# Patient Record
Sex: FEMALE | Race: WHITE | NOT HISPANIC OR LATINO | Employment: FULL TIME | URBAN - METROPOLITAN AREA
[De-identification: names, ages, dates, MRNs, and addresses within clinical notes are randomized per-mention and may not be internally consistent; named-entity substitution may affect disease eponyms.]

---

## 2017-03-20 ENCOUNTER — APPOINTMENT (EMERGENCY)
Dept: CT IMAGING | Facility: HOSPITAL | Age: 52
End: 2017-03-20
Payer: COMMERCIAL

## 2017-03-20 ENCOUNTER — HOSPITAL ENCOUNTER (EMERGENCY)
Facility: HOSPITAL | Age: 52
Discharge: HOME | End: 2017-03-20
Attending: EMERGENCY MEDICINE
Payer: COMMERCIAL

## 2017-03-20 VITALS
DIASTOLIC BLOOD PRESSURE: 67 MMHG | SYSTOLIC BLOOD PRESSURE: 130 MMHG | HEART RATE: 74 BPM | WEIGHT: 156.53 LBS | RESPIRATION RATE: 16 BRPM | OXYGEN SATURATION: 96 % | TEMPERATURE: 98 F

## 2017-03-20 DIAGNOSIS — R10.9 ABDOMINAL PAIN: Primary | ICD-10-CM

## 2017-03-20 DIAGNOSIS — K31.84 GASTROPARESIS: ICD-10-CM

## 2017-03-20 LAB
ALBUMIN SERPL BCP-MCNC: 3.9 G/DL (ref 3.5–5)
ALP SERPL-CCNC: 130 U/L (ref 46–116)
ALT SERPL W P-5'-P-CCNC: 53 U/L (ref 12–78)
ANION GAP SERPL CALCULATED.3IONS-SCNC: 10 MMOL/L (ref 4–13)
AST SERPL W P-5'-P-CCNC: 40 U/L (ref 5–45)
BASOPHILS # BLD AUTO: 0.01 THOUSANDS/ΜL (ref 0–0.1)
BASOPHILS NFR BLD AUTO: 0 % (ref 0–1)
BILIRUB SERPL-MCNC: 1 MG/DL (ref 0.2–1)
BUN SERPL-MCNC: 9 MG/DL (ref 5–25)
CALCIUM SERPL-MCNC: 9.3 MG/DL (ref 8.3–10.1)
CHLORIDE SERPL-SCNC: 101 MMOL/L (ref 100–108)
CLARITY, POC: CLEAR
CO2 SERPL-SCNC: 28 MMOL/L (ref 21–32)
COLOR, POC: YELLOW
CREAT SERPL-MCNC: 0.71 MG/DL (ref 0.6–1.3)
EOSINOPHIL # BLD AUTO: 0.04 THOUSAND/ΜL (ref 0–0.61)
EOSINOPHIL NFR BLD AUTO: 1 % (ref 0–6)
ERYTHROCYTE [DISTWIDTH] IN BLOOD BY AUTOMATED COUNT: 12.5 % (ref 11.6–15.1)
EXT BILIRUBIN, UA: ABNORMAL
EXT BLOOD URINE: ABNORMAL
EXT GLUCOSE, UA: ABNORMAL
EXT KETONES: ABNORMAL
EXT NITRITE, UA: ABNORMAL
EXT PH, UA: 6
EXT PROTEIN, UA: ABNORMAL
EXT SPECIFIC GRAVITY, UA: 1.01
EXT UROBILINOGEN: 0.2
GFR SERPL CREATININE-BSD FRML MDRD: >60 ML/MIN/1.73SQ M
GLUCOSE SERPL-MCNC: 227 MG/DL (ref 65–140)
HCT VFR BLD AUTO: 44.1 % (ref 34.8–46.1)
HGB BLD-MCNC: 14.8 G/DL (ref 11.5–15.4)
LIPASE SERPL-CCNC: 64 U/L (ref 73–393)
LYMPHOCYTES # BLD AUTO: 1.97 THOUSANDS/ΜL (ref 0.6–4.47)
LYMPHOCYTES NFR BLD AUTO: 28 % (ref 14–44)
MCH RBC QN AUTO: 28.8 PG (ref 26.8–34.3)
MCHC RBC AUTO-ENTMCNC: 33.6 G/DL (ref 31.4–37.4)
MCV RBC AUTO: 86 FL (ref 82–98)
MONOCYTES # BLD AUTO: 0.26 THOUSAND/ΜL (ref 0.17–1.22)
MONOCYTES NFR BLD AUTO: 4 % (ref 4–12)
NEUTROPHILS # BLD AUTO: 4.68 THOUSANDS/ΜL (ref 1.85–7.62)
NEUTS SEG NFR BLD AUTO: 67 % (ref 43–75)
PLATELET # BLD AUTO: 284 THOUSANDS/UL (ref 149–390)
PMV BLD AUTO: 9.8 FL (ref 8.9–12.7)
POTASSIUM SERPL-SCNC: 4 MMOL/L (ref 3.5–5.3)
PROT SERPL-MCNC: 7.3 G/DL (ref 6.4–8.2)
RBC # BLD AUTO: 5.13 MILLION/UL (ref 3.81–5.12)
SODIUM SERPL-SCNC: 139 MMOL/L (ref 136–145)
WBC # BLD AUTO: 6.96 THOUSAND/UL (ref 4.31–10.16)
WBC # BLD EST: ABNORMAL 10*3/UL

## 2017-03-20 PROCEDURE — 36415 COLL VENOUS BLD VENIPUNCTURE: CPT | Performed by: EMERGENCY MEDICINE

## 2017-03-20 PROCEDURE — 96374 THER/PROPH/DIAG INJ IV PUSH: CPT

## 2017-03-20 PROCEDURE — 83690 ASSAY OF LIPASE: CPT | Performed by: EMERGENCY MEDICINE

## 2017-03-20 PROCEDURE — 85025 COMPLETE CBC W/AUTO DIFF WBC: CPT | Performed by: EMERGENCY MEDICINE

## 2017-03-20 PROCEDURE — 99284 EMERGENCY DEPT VISIT MOD MDM: CPT

## 2017-03-20 PROCEDURE — 80053 COMPREHEN METABOLIC PANEL: CPT | Performed by: EMERGENCY MEDICINE

## 2017-03-20 PROCEDURE — 74177 CT ABD & PELVIS W/CONTRAST: CPT

## 2017-03-20 PROCEDURE — 81002 URINALYSIS NONAUTO W/O SCOPE: CPT | Performed by: EMERGENCY MEDICINE

## 2017-03-20 PROCEDURE — 96375 TX/PRO/DX INJ NEW DRUG ADDON: CPT

## 2017-03-20 RX ORDER — METOCLOPRAMIDE HYDROCHLORIDE 5 MG/ML
10 INJECTION INTRAMUSCULAR; INTRAVENOUS ONCE
Status: COMPLETED | OUTPATIENT
Start: 2017-03-20 | End: 2017-03-20

## 2017-03-20 RX ORDER — KETOROLAC TROMETHAMINE 30 MG/ML
15 INJECTION, SOLUTION INTRAMUSCULAR; INTRAVENOUS ONCE
Status: COMPLETED | OUTPATIENT
Start: 2017-03-20 | End: 2017-03-20

## 2017-03-20 RX ADMIN — METOCLOPRAMIDE 10 MG: 5 INJECTION, SOLUTION INTRAMUSCULAR; INTRAVENOUS at 08:51

## 2017-03-20 RX ADMIN — IOHEXOL 100 ML: 350 INJECTION, SOLUTION INTRAVENOUS at 09:39

## 2017-03-20 RX ADMIN — KETOROLAC TROMETHAMINE 15 MG: 30 INJECTION, SOLUTION INTRAMUSCULAR at 08:54

## 2025-01-26 LAB
ALBUMIN SERPL BCG-MCNC: 4 G/DL (ref 3.5–5)
ALP SERPL-CCNC: 85 U/L (ref 34–104)
ALT SERPL W P-5'-P-CCNC: 20 U/L (ref 7–52)
ANION GAP SERPL CALCULATED.3IONS-SCNC: 8 MMOL/L (ref 4–13)
APTT PPP: 23 SECONDS (ref 23–34)
AST SERPL W P-5'-P-CCNC: 20 U/L (ref 13–39)
BACTERIA UR QL AUTO: NORMAL /HPF
BASOPHILS # BLD AUTO: 0.04 THOUSANDS/ΜL (ref 0–0.1)
BASOPHILS NFR BLD AUTO: 0 % (ref 0–1)
BILIRUB SERPL-MCNC: 1.01 MG/DL (ref 0.2–1)
BILIRUB UR QL STRIP: NEGATIVE
BUN SERPL-MCNC: 15 MG/DL (ref 5–25)
CALCIUM SERPL-MCNC: 9.2 MG/DL (ref 8.4–10.2)
CHLORIDE SERPL-SCNC: 102 MMOL/L (ref 96–108)
CLARITY UR: CLEAR
CO2 SERPL-SCNC: 28 MMOL/L (ref 21–32)
COLOR UR: ABNORMAL
CREAT SERPL-MCNC: 0.66 MG/DL (ref 0.6–1.3)
EOSINOPHIL # BLD AUTO: 0.11 THOUSAND/ΜL (ref 0–0.61)
EOSINOPHIL NFR BLD AUTO: 1 % (ref 0–6)
ERYTHROCYTE [DISTWIDTH] IN BLOOD BY AUTOMATED COUNT: 12.9 % (ref 11.6–15.1)
GFR SERPL CREATININE-BSD FRML MDRD: 97 ML/MIN/1.73SQ M
GLUCOSE SERPL-MCNC: 211 MG/DL (ref 65–140)
GLUCOSE UR STRIP-MCNC: ABNORMAL MG/DL
HCT VFR BLD AUTO: 44.3 % (ref 34.8–46.1)
HGB BLD-MCNC: 14.5 G/DL (ref 11.5–15.4)
HGB UR QL STRIP.AUTO: NEGATIVE
IMM GRANULOCYTES # BLD AUTO: 0.04 THOUSAND/UL (ref 0–0.2)
IMM GRANULOCYTES NFR BLD AUTO: 0 % (ref 0–2)
INR PPP: 0.86 (ref 0.85–1.19)
KETONES UR STRIP-MCNC: NEGATIVE MG/DL
LACTATE SERPL-SCNC: 1.1 MMOL/L (ref 0.5–2)
LEUKOCYTE ESTERASE UR QL STRIP: ABNORMAL
LYMPHOCYTES # BLD AUTO: 1.71 THOUSANDS/ΜL (ref 0.6–4.47)
LYMPHOCYTES NFR BLD AUTO: 16 % (ref 14–44)
MCH RBC QN AUTO: 29.8 PG (ref 26.8–34.3)
MCHC RBC AUTO-ENTMCNC: 32.7 G/DL (ref 31.4–37.4)
MCV RBC AUTO: 91 FL (ref 82–98)
MONOCYTES # BLD AUTO: 0.66 THOUSAND/ΜL (ref 0.17–1.22)
MONOCYTES NFR BLD AUTO: 6 % (ref 4–12)
NEUTROPHILS # BLD AUTO: 8.33 THOUSANDS/ΜL (ref 1.85–7.62)
NEUTS SEG NFR BLD AUTO: 77 % (ref 43–75)
NITRITE UR QL STRIP: NEGATIVE
NON-SQ EPI CELLS URNS QL MICRO: NORMAL /HPF
NRBC BLD AUTO-RTO: 0 /100 WBCS
PH UR STRIP.AUTO: 6 [PH]
PLATELET # BLD AUTO: 289 THOUSANDS/UL (ref 149–390)
PMV BLD AUTO: 9.1 FL (ref 8.9–12.7)
POTASSIUM SERPL-SCNC: 3.8 MMOL/L (ref 3.5–5.3)
PROCALCITONIN SERPL-MCNC: <0.05 NG/ML
PROT SERPL-MCNC: 6.8 G/DL (ref 6.4–8.4)
PROT UR STRIP-MCNC: NEGATIVE MG/DL
PROTHROMBIN TIME: 12.4 SECONDS (ref 12.3–15)
RBC # BLD AUTO: 4.87 MILLION/UL (ref 3.81–5.12)
RBC #/AREA URNS AUTO: NORMAL /HPF
SODIUM SERPL-SCNC: 138 MMOL/L (ref 135–147)
SP GR UR STRIP.AUTO: 1.04 (ref 1–1.03)
UROBILINOGEN UR STRIP-ACNC: <2 MG/DL
WBC # BLD AUTO: 10.89 THOUSAND/UL (ref 4.31–10.16)
WBC #/AREA URNS AUTO: NORMAL /HPF

## 2025-01-26 PROCEDURE — 87040 BLOOD CULTURE FOR BACTERIA: CPT

## 2025-01-26 PROCEDURE — 84145 PROCALCITONIN (PCT): CPT

## 2025-01-26 PROCEDURE — 99283 EMERGENCY DEPT VISIT LOW MDM: CPT

## 2025-01-26 PROCEDURE — 85730 THROMBOPLASTIN TIME PARTIAL: CPT

## 2025-01-26 PROCEDURE — 80053 COMPREHEN METABOLIC PANEL: CPT

## 2025-01-26 PROCEDURE — 81001 URINALYSIS AUTO W/SCOPE: CPT

## 2025-01-26 PROCEDURE — 83605 ASSAY OF LACTIC ACID: CPT

## 2025-01-26 PROCEDURE — 36415 COLL VENOUS BLD VENIPUNCTURE: CPT

## 2025-01-26 PROCEDURE — 85025 COMPLETE CBC W/AUTO DIFF WBC: CPT

## 2025-01-26 PROCEDURE — 85610 PROTHROMBIN TIME: CPT

## 2025-01-27 ENCOUNTER — APPOINTMENT (EMERGENCY)
Dept: RADIOLOGY | Facility: HOSPITAL | Age: 60
DRG: 603 | End: 2025-01-27
Payer: COMMERCIAL

## 2025-01-27 ENCOUNTER — HOSPITAL ENCOUNTER (INPATIENT)
Facility: HOSPITAL | Age: 60
LOS: 1 days | Discharge: HOME | DRG: 603 | End: 2025-01-30
Attending: EMERGENCY MEDICINE | Admitting: INTERNAL MEDICINE
Payer: COMMERCIAL

## 2025-01-27 DIAGNOSIS — L03.113 CELLULITIS OF RIGHT HAND: Primary | ICD-10-CM

## 2025-01-27 DIAGNOSIS — L03.113 CELLULITIS OF RIGHT WRIST: ICD-10-CM

## 2025-01-27 PROBLEM — M65.311 TRIGGER FINGER OF RIGHT THUMB: Status: ACTIVE | Noted: 2018-10-02

## 2025-01-27 PROBLEM — M65.4 DE QUERVAIN'S DISEASE (TENOSYNOVITIS): Status: ACTIVE | Noted: 2018-06-15

## 2025-01-27 PROBLEM — E78.2 MIXED HYPERLIPIDEMIA: Status: ACTIVE | Noted: 2018-06-18

## 2025-01-27 PROBLEM — M18.11 ARTHRITIS OF CARPOMETACARPAL (CMC) JOINT OF RIGHT THUMB: Status: ACTIVE | Noted: 2019-11-22

## 2025-01-27 PROBLEM — G56.01 CARPAL TUNNEL SYNDROME ON RIGHT: Status: ACTIVE | Noted: 2019-11-22

## 2025-01-27 PROBLEM — E55.9 VITAMIN D DEFICIENCY: Status: ACTIVE | Noted: 2022-02-15

## 2025-01-27 PROBLEM — M65.312 TRIGGER FINGER OF LEFT THUMB: Status: ACTIVE | Noted: 2018-06-15

## 2025-01-27 PROBLEM — M54.16 RADICULOPATHY, LUMBAR REGION: Status: ACTIVE | Noted: 2021-12-30

## 2025-01-27 PROBLEM — M48.062 SPINAL STENOSIS OF LUMBAR REGION WITH NEUROGENIC CLAUDICATION: Status: ACTIVE | Noted: 2021-12-30

## 2025-01-27 PROBLEM — M81.6 LOCALIZED OSTEOPOROSIS WITHOUT CURRENT PATHOLOGICAL FRACTURE: Status: ACTIVE | Noted: 2022-02-15

## 2025-01-27 PROBLEM — M75.32 CALCIFIC TENDINITIS OF LEFT SHOULDER: Status: ACTIVE | Noted: 2017-06-26

## 2025-01-27 PROBLEM — M54.50 LUMBAR BACK PAIN: Status: ACTIVE | Noted: 2021-12-30

## 2025-01-27 LAB
GLUCOSE SERPL-MCNC: 195 MG/DL (ref 65–140)
GLUCOSE SERPL-MCNC: 201 MG/DL (ref 65–140)
GLUCOSE SERPL-MCNC: 206 MG/DL (ref 65–140)
GLUCOSE SERPL-MCNC: 97 MG/DL (ref 65–140)
URATE SERPL-MCNC: 3 MG/DL (ref 2–7.5)

## 2025-01-27 PROCEDURE — 36415 COLL VENOUS BLD VENIPUNCTURE: CPT

## 2025-01-27 PROCEDURE — 82948 REAGENT STRIP/BLOOD GLUCOSE: CPT

## 2025-01-27 PROCEDURE — 87040 BLOOD CULTURE FOR BACTERIA: CPT

## 2025-01-27 PROCEDURE — 84550 ASSAY OF BLOOD/URIC ACID: CPT | Performed by: PHYSICIAN ASSISTANT

## 2025-01-27 PROCEDURE — 73130 X-RAY EXAM OF HAND: CPT

## 2025-01-27 PROCEDURE — 96374 THER/PROPH/DIAG INJ IV PUSH: CPT

## 2025-01-27 PROCEDURE — 99223 1ST HOSP IP/OBS HIGH 75: CPT

## 2025-01-27 PROCEDURE — 99285 EMERGENCY DEPT VISIT HI MDM: CPT

## 2025-01-27 RX ORDER — PIOGLITAZONE 45 MG/1
45 TABLET ORAL DAILY
COMMUNITY
Start: 2024-07-29 | End: 2025-07-29

## 2025-01-27 RX ORDER — CYCLOBENZAPRINE HCL 10 MG
10 TABLET ORAL 3 TIMES DAILY PRN
Status: DISCONTINUED | OUTPATIENT
Start: 2025-01-27 | End: 2025-01-30 | Stop reason: HOSPADM

## 2025-01-27 RX ORDER — GABAPENTIN 300 MG/1
CAPSULE ORAL
COMMUNITY

## 2025-01-27 RX ORDER — LORATADINE 10 MG/1
10 TABLET ORAL DAILY
Status: DISCONTINUED | OUTPATIENT
Start: 2025-01-27 | End: 2025-01-30 | Stop reason: HOSPADM

## 2025-01-27 RX ORDER — TOBRAMYCIN AND DEXAMETHASONE 3; 1 MG/ML; MG/ML
SUSPENSION/ DROPS OPHTHALMIC
COMMUNITY
Start: 2024-12-12

## 2025-01-27 RX ORDER — LORAZEPAM 1 MG/1
TABLET ORAL
COMMUNITY

## 2025-01-27 RX ORDER — ENOXAPARIN SODIUM 100 MG/ML
40 INJECTION SUBCUTANEOUS DAILY
Status: DISCONTINUED | OUTPATIENT
Start: 2025-01-27 | End: 2025-01-30 | Stop reason: HOSPADM

## 2025-01-27 RX ORDER — INSULIN LISPRO 100 [IU]/ML
1-5 INJECTION, SOLUTION INTRAVENOUS; SUBCUTANEOUS
Status: DISCONTINUED | OUTPATIENT
Start: 2025-01-27 | End: 2025-01-30 | Stop reason: HOSPADM

## 2025-01-27 RX ORDER — KETOROLAC TROMETHAMINE 30 MG/ML
15 INJECTION, SOLUTION INTRAMUSCULAR; INTRAVENOUS EVERY 6 HOURS PRN
Status: ACTIVE | OUTPATIENT
Start: 2025-01-27 | End: 2025-01-29

## 2025-01-27 RX ORDER — IBUPROFEN 200 MG
200 TABLET ORAL EVERY 6 HOURS PRN
COMMUNITY

## 2025-01-27 RX ORDER — CEPHALEXIN 500 MG/1
500 CAPSULE ORAL 4 TIMES DAILY
COMMUNITY
Start: 2025-01-26 | End: 2025-02-05

## 2025-01-27 RX ORDER — HYDROMORPHONE HCL/PF 1 MG/ML
0.5 SYRINGE (ML) INJECTION EVERY 4 HOURS PRN
Status: DISCONTINUED | OUTPATIENT
Start: 2025-01-27 | End: 2025-01-30 | Stop reason: HOSPADM

## 2025-01-27 RX ORDER — OXYCODONE AND ACETAMINOPHEN 10; 325 MG/1; MG/1
TABLET ORAL
COMMUNITY
End: 2025-01-27 | Stop reason: ALTCHOICE

## 2025-01-27 RX ORDER — GABAPENTIN 300 MG/1
600 CAPSULE ORAL 3 TIMES DAILY
Status: DISCONTINUED | OUTPATIENT
Start: 2025-01-27 | End: 2025-01-30 | Stop reason: HOSPADM

## 2025-01-27 RX ORDER — CEFAZOLIN SODIUM 1 G/50ML
1000 SOLUTION INTRAVENOUS EVERY 8 HOURS
Status: DISCONTINUED | OUTPATIENT
Start: 2025-01-27 | End: 2025-01-30 | Stop reason: HOSPADM

## 2025-01-27 RX ORDER — ESOMEPRAZOLE MAGNESIUM 40 MG/1
CAPSULE, DELAYED RELEASE ORAL
COMMUNITY
Start: 2024-12-30

## 2025-01-27 RX ORDER — GLIMEPIRIDE 4 MG/1
2 TABLET ORAL EVERY MORNING
COMMUNITY
Start: 2024-09-20

## 2025-01-27 RX ORDER — KETOROLAC TROMETHAMINE 30 MG/ML
15 INJECTION, SOLUTION INTRAMUSCULAR; INTRAVENOUS ONCE
Status: COMPLETED | OUTPATIENT
Start: 2025-01-27 | End: 2025-01-27

## 2025-01-27 RX ORDER — ROPINIROLE 1 MG/1
1 TABLET, FILM COATED ORAL
COMMUNITY
Start: 2024-12-20

## 2025-01-27 RX ORDER — MELOXICAM 7.5 MG/1
7.5 TABLET ORAL DAILY
COMMUNITY
Start: 2024-10-08 | End: 2025-10-08

## 2025-01-27 RX ORDER — PANTOPRAZOLE SODIUM 40 MG/1
40 TABLET, DELAYED RELEASE ORAL
Status: DISCONTINUED | OUTPATIENT
Start: 2025-01-27 | End: 2025-01-30 | Stop reason: HOSPADM

## 2025-01-27 RX ORDER — OXYCODONE HYDROCHLORIDE 5 MG/1
5 TABLET ORAL EVERY 6 HOURS PRN
Status: DISCONTINUED | OUTPATIENT
Start: 2025-01-27 | End: 2025-01-30 | Stop reason: HOSPADM

## 2025-01-27 RX ORDER — CEFAZOLIN SODIUM 2 G/50ML
2000 SOLUTION INTRAVENOUS ONCE
Status: COMPLETED | OUTPATIENT
Start: 2025-01-27 | End: 2025-01-27

## 2025-01-27 RX ORDER — ACETAMINOPHEN 325 MG/1
650 TABLET ORAL EVERY 6 HOURS PRN
Status: DISCONTINUED | OUTPATIENT
Start: 2025-01-27 | End: 2025-01-30 | Stop reason: HOSPADM

## 2025-01-27 RX ORDER — CYCLOBENZAPRINE HCL 10 MG
10 TABLET ORAL 3 TIMES DAILY PRN
COMMUNITY
Start: 2024-12-31 | End: 2025-01-30

## 2025-01-27 RX ORDER — ROPINIROLE 1 MG/1
1 TABLET, FILM COATED ORAL
Status: DISCONTINUED | OUTPATIENT
Start: 2025-01-27 | End: 2025-01-30 | Stop reason: HOSPADM

## 2025-01-27 RX ORDER — LORATADINE 10 MG/1
10 TABLET ORAL
COMMUNITY

## 2025-01-27 RX ORDER — NEBIVOLOL 10 MG/1
TABLET ORAL
COMMUNITY
End: 2025-01-27 | Stop reason: ALTCHOICE

## 2025-01-27 RX ORDER — NORTRIPTYLINE HYDROCHLORIDE 75 MG/1
CAPSULE ORAL
COMMUNITY
End: 2025-01-27 | Stop reason: ALTCHOICE

## 2025-01-27 RX ADMIN — OXYCODONE HYDROCHLORIDE 5 MG: 5 TABLET ORAL at 17:50

## 2025-01-27 RX ADMIN — GABAPENTIN 600 MG: 300 CAPSULE ORAL at 17:42

## 2025-01-27 RX ADMIN — CEFAZOLIN SODIUM 2000 MG: 2 SOLUTION INTRAVENOUS at 04:02

## 2025-01-27 RX ADMIN — LORATADINE 10 MG: 10 TABLET ORAL at 21:09

## 2025-01-27 RX ADMIN — CEFAZOLIN SODIUM 1000 MG: 1 SOLUTION INTRAVENOUS at 11:38

## 2025-01-27 RX ADMIN — GABAPENTIN 600 MG: 300 CAPSULE ORAL at 10:04

## 2025-01-27 RX ADMIN — OXYCODONE HYDROCHLORIDE 5 MG: 5 TABLET ORAL at 05:06

## 2025-01-27 RX ADMIN — CEFAZOLIN SODIUM 1000 MG: 1 SOLUTION INTRAVENOUS at 21:10

## 2025-01-27 RX ADMIN — HYDROMORPHONE HYDROCHLORIDE 0.5 MG: 1 INJECTION, SOLUTION INTRAMUSCULAR; INTRAVENOUS; SUBCUTANEOUS at 11:37

## 2025-01-27 RX ADMIN — KETOROLAC TROMETHAMINE 15 MG: 30 INJECTION, SOLUTION INTRAMUSCULAR; INTRAVENOUS at 02:37

## 2025-01-27 RX ADMIN — INSULIN LISPRO 1 UNITS: 100 INJECTION, SOLUTION INTRAVENOUS; SUBCUTANEOUS at 17:42

## 2025-01-27 RX ADMIN — INSULIN LISPRO 1 UNITS: 100 INJECTION, SOLUTION INTRAVENOUS; SUBCUTANEOUS at 21:11

## 2025-01-27 RX ADMIN — HYDROMORPHONE HYDROCHLORIDE 0.5 MG: 1 INJECTION, SOLUTION INTRAMUSCULAR; INTRAVENOUS; SUBCUTANEOUS at 21:15

## 2025-01-27 RX ADMIN — INSULIN LISPRO 1 UNITS: 100 INJECTION, SOLUTION INTRAVENOUS; SUBCUTANEOUS at 14:03

## 2025-01-27 RX ADMIN — ENOXAPARIN SODIUM 40 MG: 40 INJECTION SUBCUTANEOUS at 10:04

## 2025-01-27 RX ADMIN — GABAPENTIN 600 MG: 300 CAPSULE ORAL at 21:09

## 2025-01-27 RX ADMIN — ROPINIROLE HYDROCHLORIDE 1 MG: 1 TABLET, FILM COATED ORAL at 21:09

## 2025-01-27 RX ADMIN — PANTOPRAZOLE SODIUM 40 MG: 20 TABLET, DELAYED RELEASE ORAL at 10:04

## 2025-01-27 NOTE — ED PROVIDER NOTES
Time reflects when diagnosis was documented in both MDM as applicable and the Disposition within this note       Time User Action Codes Description Comment    1/27/2025  4:00 AM Cortney Pamella Add [L03.113] Cellulitis of right hand     1/27/2025  4:00 AM Pamella Barr Add [L03.113] Cellulitis of right wrist           ED Disposition       ED Disposition   Admit    Condition   Stable    Date/Time   Mon Jan 27, 2025  4:00 AM    Comment   Case was discussed with Alejandra Fan and the patient's admission status was agreed to be Admission Status: observation status to the service of Dr. Almonte.               Assessment & Plan       Medical Decision Making  Patient seen, examined and noted to have cellulitis of right hand and cellulitis of right wrist.  Patient coming in after noticing her hand was red and painful yesterday.  Went to urgent care today and was prescribed Keflex which she has taken 2 doses of.  She notes that she is coming in because the pain is worsening as well as the redness spreading. She notes that since she has been in the waiting room it has now crept up her right wrist. Patient is right-hand dominant and has been trouble using her right thumb.  X-ray ordered to rule out osteomyelitis as she recently had a steroid injection.  No osteo or traumatic injuries per my read.  Ultrasound done at bedside showing cobblestoning.  Patient was admitted to internal medicine for further management IV antibiotics.    Differential diagnosis includes but is not limited to cellulitis, osteomyelitis, flexor tenosynovitis, abscess      Patient's labs notable for: WBC of 10.89    Imaging revealed:   No fractures or osteomyelitis per my read    Discussed patient's case with Alejandra Fan ([facility]) regarding admission who accepted the patient for further evaluation and management.    Patient verbally acknowledged understanding of the above communications. Strict return precautions were provided. All labs reviewed  and utilized in the medical decision making process.    Amount and/or Complexity of Data Reviewed  Radiology: ordered and independent interpretation performed.    Risk  Prescription drug management.  Decision regarding hospitalization.        ED Course as of 01/27/25 0454   Mon Jan 27, 2025   0339 Cobblestoning on bedside US   0358 Reached out to [facility] for admission       Medications   ketorolac (TORADOL) injection 15 mg (has no administration in time range)   oxyCODONE (ROXICODONE) IR tablet 5 mg (has no administration in time range)   HYDROmorphone (DILAUDID) injection 0.5 mg (has no administration in time range)   acetaminophen (TYLENOL) tablet 650 mg (has no administration in time range)   ketorolac (TORADOL) injection 15 mg (15 mg Intravenous Given 1/27/25 0237)   ceFAZolin (ANCEF) IVPB (premix in dextrose) 2,000 mg 50 mL (2,000 mg Intravenous New Bag 1/27/25 0402)       ED Risk Strat Scores                          SBIRT 20yo+      Flowsheet Row Most Recent Value   Initial Alcohol Screen: US AUDIT-C     1. How often do you have a drink containing alcohol? 0 Filed at: 01/26/2025 2122   2. How many drinks containing alcohol do you have on a typical day you are drinking?  0 Filed at: 01/26/2025 2122   3a. Male UNDER 65: How often do you have five or more drinks on one occasion? 0 Filed at: 01/26/2025 2122   3b. FEMALE Any Age, or MALE 65+: How often do you have 4 or more drinks on one occassion? 0 Filed at: 01/26/2025 2122   Audit-C Score 0 Filed at: 01/26/2025 2122   CARO: How many times in the past year have you...    Used an illegal drug or used a prescription medication for non-medical reasons? Never Filed at: 01/26/2025 2122                            History of Present Illness       Chief Complaint   Patient presents with    Hand Swelling     Patient comes in reporting R hand pain / swelling which began yesterday. States she went to urgent care and was told to come to ED if worsening redness or swelling.  States she marked redness with skin marker and redness has spread. Rates pain 10/10. Started on keflex today, states no improvement.        Past Medical History:   Diagnosis Date    Diabetes mellitus (HCC)     Gastroparesis     Giordano's syndrome       History reviewed. No pertinent surgical history.   History reviewed. No pertinent family history.   Social History     Tobacco Use    Smoking status: Never   Substance Use Topics    Alcohol use: No      E-Cigarette/Vaping      E-Cigarette/Vaping Substances      I have reviewed and agree with the history as documented.     Loreto Taylor is a 59 y.o. female with a PMH of diabetes presenting to the ED on January 27, 2025 with hand swelling. Patient endorses that she noticed some swelling and redness to her right hand yesterday morning.  She notes that it was limited to a small area on the dorsal surface of her thumb.  When she woke up this morning the redness had grown so she went to urgent care where she was prescribed Keflex.  She has taken 2 doses of this so far.  Patient is coming into the ER because pain is increasing and redness is swelling.  Patient did not have any trauma to this area or breaks in the skin that she knows of.  Had a steroid injection of her right wrist earlier this month.  Patient is right-hand dominant and having trouble moving her right thumb. Patient denies fevers, chills, trauma, nausea, vomiting or any other complaints at this time.           Review of Systems   Constitutional:  Negative for chills and fever.   Gastrointestinal:  Negative for nausea and vomiting.   Musculoskeletal:  Positive for arthralgias and joint swelling.   Skin:  Positive for color change. Negative for wound.           Objective       ED Triage Vitals   Temperature Pulse Blood Pressure Respirations SpO2 Patient Position - Orthostatic VS   01/26/25 2119 01/26/25 2122 01/26/25 2122 01/26/25 2122 01/26/25 2122 --   98.3 °F (36.8 °C) 101 160/99 20 98 %       Temp Source  Heart Rate Source BP Location FiO2 (%) Pain Score    01/26/25 2119 01/27/25 0236 01/27/25 0236 -- 01/27/25 0236    Oral Monitor Right arm  10 - Worst Possible Pain      Vitals      Date and Time Temp Pulse SpO2 Resp BP Pain Score FACES Pain Rating User   01/27/25 0237 -- -- -- -- -- 10 - Worst Possible Pain -- CG   01/27/25 0236 -- 96 99 % 20 121/64 10 - Worst Possible Pain -- CG   01/26/25 2122 -- 101 98 % 20 160/99 -- --    01/26/25 2119 98.3 °F (36.8 °C) -- -- -- -- -- --             Physical Exam  Constitutional:       General: She is not in acute distress.     Appearance: Normal appearance. She is normal weight. She is not ill-appearing or toxic-appearing.   HENT:      Head: Normocephalic and atraumatic.   Cardiovascular:      Rate and Rhythm: Normal rate and regular rhythm.      Pulses: Normal pulses.      Heart sounds: Normal heart sounds. No murmur heard.     No friction rub. No gallop.   Pulmonary:      Effort: Pulmonary effort is normal. No respiratory distress.      Breath sounds: Normal breath sounds. No stridor. No wheezing, rhonchi or rales.   Chest:      Chest wall: No tenderness.   Musculoskeletal:         General: Swelling and tenderness present. No deformity or signs of injury.      Right wrist: Tenderness present.      Left wrist: Normal.      Right hand: Swelling and tenderness present. Decreased range of motion.      Left hand: Normal.        Hands:    Neurological:      Mental Status: She is alert.         Results Reviewed       Procedure Component Value Units Date/Time    Blood culture #1 [[id number]] Collected: 01/27/25 0332    Lab Status: In process Specimen: Blood from Arm, Left Updated: 01/27/25 0336    Urine Microscopic [[id number]]  (Normal) Collected: 01/26/25 2251    Lab Status: Final result Specimen: Urine, Clean Catch Updated: 01/26/25 2328     RBC, UA None Seen /hpf      WBC, UA 1-2 /hpf      Epithelial Cells None Seen /hpf      Bacteria, UA None Seen /hpf     UA w Reflex to  Microscopic w Reflex to Culture [[id number]]  (Abnormal) Collected: 01/26/25 2251    Lab Status: Final result Specimen: Urine, Clean Catch Updated: 01/26/25 2321     Color, UA Light Yellow     Clarity, UA Clear     Specific Gravity, UA 1.043     pH, UA 6.0     Leukocytes, UA Elevated glucose may cause decreased leukocyte values. See urine microscopic for UWBC result     Nitrite, UA Negative     Protein, UA Negative mg/dl      Glucose, UA >=1000 (1%) mg/dl      Ketones, UA Negative mg/dl      Urobilinogen, UA <2.0 mg/dl      Bilirubin, UA Negative     Occult Blood, UA Negative    APTT [[id number]]  (Normal) Collected: 01/26/25 2129    Lab Status: Final result Specimen: Blood from Arm, Left Updated: 01/26/25 2226     PTT 23 seconds     Protime-INR [[id number]]  (Normal) Collected: 01/26/25 2129    Lab Status: Final result Specimen: Blood from Arm, Left Updated: 01/26/25 2226     Protime 12.4 seconds      INR 0.86    Narrative:      INR Therapeutic Range    Indication                                             INR Range      Atrial Fibrillation                                               2.0-3.0  Hypercoagulable State                                    2.0.2.3  Left Ventricular Asist Device                            2.0-3.0  Mechanical Heart Valve                                  -    Aortic(with afib, MI, embolism, HF, LA enlargement,    and/or coagulopathy)                                     2.0-3.0 (2.5-3.5)     Mitral                                                             2.5-3.5  Prosthetic/Bioprosthetic Heart Valve               2.0-3.0  Venous thromboembolism (VTE: VT, PE        2.0-3.0    Procalcitonin [[id number]]  (Normal) Collected: 01/26/25 2129    Lab Status: Final result Specimen: Blood from Arm, Left Updated: 01/26/25 2216     Procalcitonin <0.05 ng/ml     Comprehensive metabolic panel [[id number]]  (Abnormal) Collected: 01/26/25 2129    Lab Status: Final result Specimen: Blood from Arm, Left  Updated: 01/26/25 2207     Sodium 138 mmol/L      Potassium 3.8 mmol/L      Chloride 102 mmol/L      CO2 28 mmol/L      ANION GAP 8 mmol/L      BUN 15 mg/dL      Creatinine 0.66 mg/dL      Glucose 211 mg/dL      Calcium 9.2 mg/dL      AST 20 U/L      ALT 20 U/L      Alkaline Phosphatase 85 U/L      Total Protein 6.8 g/dL      Albumin 4.0 g/dL      Total Bilirubin 1.01 mg/dL      eGFR 97 ml/min/1.73sq m     Narrative:      National Kid[employer] guidelines for Chronic Kidney Disease (CKD):     Stage 1 with normal or high GFR (GFR > 90 mL/min/1.73 square meters)    Stage 2 Mild CKD (GFR = 60-89 mL/min/1.73 square meters)    Stage 3A Moderate CKD (GFR = 45-59 mL/min/1.73 square meters)    Stage 3B Moderate CKD (GFR = 30-44 mL/min/1.73 square meters)    Stage 4 Severe CKD (GFR = 15-29 mL/min/1.73 square meters)    Stage 5 End Stage CKD (GFR <15 mL/min/1.73 square meters)  Note: GFR calculation is accurate only with a steady state creatinine    Lactic acid, plasma (w/reflex if result > 2.0) [[id number]]  (Normal) Collected: 01/26/25 2129    Lab Status: Final result Specimen: Blood from Arm, Left Updated: 01/26/25 2205     LACTIC ACID 1.1 mmol/L     Narrative:      Result may be elevated if tourniquet was used during collection.    CBC and differential [[id number]]  (Abnormal) Collected: 01/26/25 2129    Lab Status: Final result Specimen: Blood from Arm, Left Updated: 01/26/25 2141     WBC 10.89 Thousand/uL      RBC 4.87 Million/uL      Hemoglobin 14.5 g/dL      Hematocrit 44.3 %      MCV 91 fL      MCH 29.8 pg      MCHC 32.7 g/dL      RDW 12.9 %      MPV 9.1 fL      Platelets 289 Thousands/uL      nRBC 0 /100 WBCs      Segmented % 77 %      Immature Grans % 0 %      Lymphocytes % 16 %      Monocytes % 6 %      Eosinophils Relative 1 %      Basophils Relative 0 %      Absolute Neutrophils 8.33 Thousands/µL      Absolute Immature Grans 0.04 Thousand/uL      Absolute Lymphocytes 1.71 Thousands/µL      Absolute  Monocytes 0.66 Thousand/µL      Eosinophils Absolute 0.11 Thousand/µL      Basophils Absolute 0.04 Thousands/µL     Blood culture #2 [[id number]] Collected: 01/26/25 2135    Lab Status: In process Specimen: Blood Updated: 01/26/25 2135            XR hand 3+ views RIGHT   ED Interpretation by Pamella Barr PA-C (01/27 0479)   No osteomyelitis or fractures per my read          Procedures    ED Medication and Procedure Management   None     Patient's Medications    No medications on file     No discharge procedures on file.  ED SEPSIS DOCUMENTATION   Time reflects when diagnosis was documented in both MDM as applicable and the Disposition within this note       Time User Action Codes Description Comment    1/27/2025  4:00 AM Pamella Barr Add [L03.113] Cellulitis of right hand     1/27/2025  4:00 AM Pamella Barr Add [L03.113] Cellulitis of right wrist                  Pamella Barr PA-C  01/27/25 045

## 2025-01-27 NOTE — PLAN OF CARE
Problem: PAIN - ADULT  Goal: Verbalizes/displays adequate comfort level or baseline comfort level  Description: Interventions:  - Encourage patient to monitor pain and request assistance  - Assess pain using appropriate pain scale  - Administer analgesics based on type and severity of pain and evaluate response  - Implement non-pharmacological measures as appropriate and evaluate response  - Consider cultural and social influences on pain and pain management  - Notify physician/advanced practitioner if interventions unsuccessful or patient reports new pain  Outcome: Progressing     Problem: INFECTION - ADULT  Goal: Absence or prevention of progression during hospitalization  Description: INTERVENTIONS:  - Assess and monitor for signs and symptoms of infection  - Monitor lab/diagnostic results  - Monitor all insertion sites, i.e. indwelling lines, tubes, and drains  - Monitor endotracheal if appropriate and nasal secretions for changes in amount and color  - Ste[place]uer appropriate cooling/warming therapies per order  - Administer medications as ordered  - Instruct and encourage patient and family to use good hand hygiene technique  - Identify and instruct in appropriate isolation precautions for identified infection/condition  Outcome: Progressing

## 2025-01-27 NOTE — H&P
"H&P - Hospitalist   Name: Loreto Taylor 59 y.o. 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  Went to urgent care yesterday due to right hand swelling, redness, pain and was prescribed keflex for cellulitis. Reports that the redness is spreading up her forearm now.   Fortunately patient is afebrile without sepsis criteria.   IV Ancef   Follow up formal xray read   PRN analgesia   Check uric acid   If no improvement with IV abx, consider CT scan and ortho consult    Restless legs syndrome  Continue requip   Giordano syndrome  History noted  Type 2 diabetes mellitus (HCC)  Lab Results   Component Value Date    HGBA1C 8.8 (H) 12/23/2024       No results for input(s): \"POCGLU\" in the last 72 hours.    Blood Sugar Average: Last 72 hrs:    Hold home oral meds   Start SSI with accuchecks   Avoid hypoglycemia       VTE Pharmacologic Prophylaxis: VTE Score: 3 Moderate Risk (Score 3-4) - Pharmacological DVT Prophylaxis Ordered: enoxaparin (Lovenox).  Code Status: Level 1 - Full Code   Discussion with family: Patient declined call to .     Anticipated Length of Stay: Patient will be admitted on an observation basis with an anticipated length of stay of less than 2 midnights secondary to right hand cellulitis.    History of Present Illness   Chief Complaint: right hand pain/swelling/erythema    Loreto Taylor is a 59 y.o. female with a PMH of T2DM, Giordano syndrome, RLS who " presents with acute onset pain, swelling, redness of her right hand, particularly at the base of her right thumb. She saw urgent care yesterday who prescribed Keflex, came to the ER today due to rapidly spreading redness she noted streaking up hr wrist to her forearm. No fever/chills. [facility] for De Quervain's tenosynovitis, right thumb trigger finger, arthritis of CMC joint of right thumb, carpal tunnel syndrome for which she receives corticosteroid injections, last injection was 01/09.     Review of Systems   Constitutional:  Negative for chills and fever.   Respiratory:  Negative for shortness of breath.    Cardiovascular:  Negative for chest pain.   Gastrointestinal:  Negative for abdominal pain, nausea and vomiting.   Musculoskeletal:  Positive for arthralgias.   Skin:  Positive for color change. Negative for wound.        Historical Information   Past Medical History:   Diagnosis Date    Diabetes mellitus (HCC)     Gastroparesis     Giordano's syndrome      History reviewed. No pertinent surgical history.  Social History     Tobacco Use    Smoking status: Never    Smokeless tobacco: Not on file   Substance and Sexual Activity    Alcohol use: No    Drug use: Not on file    Sexual activity: Not on file     E-Cigarette/Vaping     E-Cigarette/Vaping Substances     History reviewed. No pertinent family history.  Social History:  Marital Status:    Patient Pre-hospital Living Situation: Home  Patient Pre-hospital Level of Mobility: walks  Patient Pre-hospital Diet Restrictions: diabetic    Meds/Allergies   I have reviewed home medications with patient personally.  Prior to Admission medications    Medication Sig Start Date End Date Taking? Authorizing Provider   cephalexin (KEFLEX) 500 mg capsule Take 500 mg by mouth 4 (four) times a day 1/26/25 2/5/25 Yes Historical Provider, MD   cyclobenzaprine (FLEXERIL) 10 mg tablet Take 10 mg by mouth Three times daily as needed 12/31/24 1/30/25  Yes Historical Provider, MD   Empagliflozin (JARDIANCE) 10 MG TABS tablet Take 10 mg by mouth daily 1/7/25  Yes Historical Provider, MD   esomeprazole (NexIUM) 40 MG capsule  12/30/24  Yes Historical Provider, MD   glimepiride (AMARYL) 4 mg tablet Take 2 tablets by mouth every morning 9/20/24  Yes Historical Provider, MD   meloxicam (MOBIC) 7.5 mg tablet Take 7.5 mg by mouth daily 10/8/24 10/8/25 Yes Historical Provider, MD   metFORMIN (GLUCOPHAGE) 500 mg tablet Take 1 tablet by mouth 2 (two) times a day with meals 12/30/24  Yes Historical Provider, MD   pioglitazone (ACTOS) 45 mg tablet Take 45 mg by mouth daily 7/29/24 7/29/25 Yes Historical Provider, MD   rOPINIRole (REQUIP) 1 mg tablet Take 1 mg by mouth 12/20/24  Yes Historical Provider, MD   tobramycin-dexamethasone (TOBRADEX) ophthalmic suspension  12/12/24  Yes Historical Provider, MD   gabapentin (Neurontin) 300 mg capsule Take by mouth    Historical Provider, MD   ibuprofen (MOTRIN) 200 mg tablet Take 200 mg by mouth every 6 (six) hours as needed    Historical Provider, MD   loratadine (CLARITIN) 10 mg tablet Take 10 mg by mouth    Historical Provider, MD   LORazepam (Ativan) 1 mg tablet Take by mouth    Historical Provider, MD   nebivolol (Bystolic) 10 mg tablet Take by mouth    Historical Provider, MD   nortriptyline (PAMELOR) 75 MG capsule Take by mouth    Historical Provider, MD   oxyCODONE-acetaminophen (Percocet)  mg per tablet Take by mouth    Historical Provider, MD   sitaGLIPtin (Januvia) 100 mg tablet Take by mouth    Historical Provider, MD     Allergies   Allergen Reactions    Cortisporin [Neomycin-Polymyxin-Hc]     Bacitra-Neomycin-Polymyxin-Hc Edema, Other (See Comments) and Rash     Ears got hot, itching and red    Medical Tape Rash     No paper tape       Objective :  Temp:  [98.3 °F (36.8 °C)] 98.3 °F (36.8 °C)  HR:  [] 96  BP: (121-160)/(64-99) 121/64  Resp:  [20] 20  SpO2:  [98 %-99 %] 99 %  O2 Device: None (Room  air)    Physical Exam  Constitutional:       General: She is not in acute distress.  HENT:      Mouth/Throat:      Mouth: Mucous membranes are moist.   Eyes:      General: No scleral icterus.  Cardiovascular:      Rate and Rhythm: Normal rate and regular rhythm.      Heart sounds: Normal heart sounds.   Pulmonary:      Effort: Pulmonary effort is normal.      Breath sounds: Normal breath sounds.   Abdominal:      General: Abdomen is flat. Bowel sounds are normal.      Palpations: Abdomen is soft.      Tenderness: There is no abdominal tenderness.   Musculoskeletal:      Right lower leg: No edema.      Left lower leg: No edema.   Skin:     Findings: Erythema present.      Comments: Erythema, tenderness, warmth, swelling on the dorsal aspect of the right hand isolated to the base of the right thumb extending to the wrist and slightly up the forearm streaking    Neurological:      General: No focal deficit present.      Mental Status: She is alert and oriented to person, place, and time.   Psychiatric:         Mood and Affect: Mood normal.          Lines/Drains:            Lab Results: I have reviewed the following results:  Results from last 7 days   Lab Units 01/26/25 2129   WBC Thousand/uL 10.89*   HEMOGLOBIN g/dL 14.5   HEMATOCRIT % 44.3   PLATELETS Thousands/uL 289   SEGS PCT % 77*   LYMPHO PCT % 16   MONO PCT % 6   EOS PCT % 1     Results from last 7 days   Lab Units 01/26/25 2129   SODIUM mmol/L 138   POTASSIUM mmol/L 3.8   CHLORIDE mmol/L 102   CO2 mmol/L 28   BUN mg/dL 15   CREATININE mg/dL 0.66   ANION GAP mmol/L 8   CALCIUM mg/dL 9.2   ALBUMIN g/dL 4.0   TOTAL BILIRUBIN mg/dL 1.01*   ALK PHOS U/L 85   ALT U/L 20   AST U/L 20   GLUCOSE RANDOM mg/dL 211*     Results from last 7 days   Lab Units 01/26/25 2129   INR  0.86         Lab Results   Component Value Date    HGBA1C 8.8 (H) 12/23/2024    HGBA1C 9 (H) 01/02/2024    HGBA1C 7.7 (H) 04/27/2023     Results from last 7 days   Lab Units 01/26/25 2129    LACTIC ACID mmol/L 1.1   PROCALCITONIN ng/ml <0.05       Imaging Results Review: I reviewed radiology reports from this admission including: xray(s).  Other Study Results Review: No additional pertinent studies reviewed.    Administrative Statements   I have spent a total time of 60 minutes in caring for this patient on the day of the visit/encounter including Diagnostic results, Prognosis, Risks and benefits of tx options, Instructions for management, Patient and family education, Importance of tx compliance, Risk factor reductions, Impressions, Counseling / Coordination of care, Documenting in the medical record, Reviewing / ordering tests, medicine, procedures  , and Obtaining or reviewing history  .    ** Please Note: This note has been constructed using a voice recognition system. **

## 2025-01-27 NOTE — ASSESSMENT & PLAN NOTE
"Lab Results   Component Value Date    HGBA1C 8.8 (H) 12/23/2024       No results for input(s): \"POCGLU\" in the last 72 hours.    Blood Sugar Average: Last 72 hrs:    Hold home oral meds   Start SSI with accuchecks   Avoid hypoglycemia   "

## 2025-01-27 NOTE — ASSESSMENT & PLAN NOTE
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  Went to urgent care yesterday due to right hand swelling, redness, pain and was prescribed keflex for cellulitis. Reports that the redness is spreading up her forearm now.   Fortunately patient is afebrile without sepsis criteria.   IV Ancef   Follow up formal xray read   PRN analgesia   Check uric acid   If no improvement with IV abx, consider CT scan and ortho consult

## 2025-01-28 ENCOUNTER — APPOINTMENT (OUTPATIENT)
Dept: CT IMAGING | Facility: HOSPITAL | Age: 60
DRG: 603 | End: 2025-01-28
Payer: COMMERCIAL

## 2025-01-28 LAB
ANION GAP SERPL CALCULATED.3IONS-SCNC: 8 MMOL/L (ref 4–13)
BASOPHILS # BLD AUTO: 0.03 THOUSANDS/ΜL (ref 0–0.1)
BASOPHILS NFR BLD AUTO: 0 % (ref 0–1)
BUN SERPL-MCNC: 12 MG/DL (ref 5–25)
CALCIUM SERPL-MCNC: 8.5 MG/DL (ref 8.4–10.2)
CHLORIDE SERPL-SCNC: 102 MMOL/L (ref 96–108)
CO2 SERPL-SCNC: 26 MMOL/L (ref 21–32)
CREAT SERPL-MCNC: 0.47 MG/DL (ref 0.6–1.3)
EOSINOPHIL # BLD AUTO: 0.03 THOUSAND/ΜL (ref 0–0.61)
EOSINOPHIL NFR BLD AUTO: 0 % (ref 0–6)
ERYTHROCYTE [DISTWIDTH] IN BLOOD BY AUTOMATED COUNT: 12.9 % (ref 11.6–15.1)
GFR SERPL CREATININE-BSD FRML MDRD: 109 ML/MIN/1.73SQ M
GLUCOSE SERPL-MCNC: 136 MG/DL (ref 65–140)
GLUCOSE SERPL-MCNC: 154 MG/DL (ref 65–140)
GLUCOSE SERPL-MCNC: 156 MG/DL (ref 65–140)
GLUCOSE SERPL-MCNC: 281 MG/DL (ref 65–140)
GLUCOSE SERPL-MCNC: 283 MG/DL (ref 65–140)
GLUCOSE SERPL-MCNC: 298 MG/DL (ref 65–140)
GLUCOSE SERPL-MCNC: 324 MG/DL (ref 65–140)
HCT VFR BLD AUTO: 37.6 % (ref 34.8–46.1)
HGB BLD-MCNC: 12.4 G/DL (ref 11.5–15.4)
IMM GRANULOCYTES # BLD AUTO: 0.05 THOUSAND/UL (ref 0–0.2)
IMM GRANULOCYTES NFR BLD AUTO: 1 % (ref 0–2)
LYMPHOCYTES # BLD AUTO: 1.88 THOUSANDS/ΜL (ref 0.6–4.47)
LYMPHOCYTES NFR BLD AUTO: 20 % (ref 14–44)
MAGNESIUM SERPL-MCNC: 2.2 MG/DL (ref 1.9–2.7)
MCH RBC QN AUTO: 30.2 PG (ref 26.8–34.3)
MCHC RBC AUTO-ENTMCNC: 33 G/DL (ref 31.4–37.4)
MCV RBC AUTO: 92 FL (ref 82–98)
MONOCYTES # BLD AUTO: 0.69 THOUSAND/ΜL (ref 0.17–1.22)
MONOCYTES NFR BLD AUTO: 7 % (ref 4–12)
NEUTROPHILS # BLD AUTO: 6.98 THOUSANDS/ΜL (ref 1.85–7.62)
NEUTS SEG NFR BLD AUTO: 72 % (ref 43–75)
NRBC BLD AUTO-RTO: 0 /100 WBCS
PLATELET # BLD AUTO: 229 THOUSANDS/UL (ref 149–390)
PMV BLD AUTO: 9.6 FL (ref 8.9–12.7)
POTASSIUM SERPL-SCNC: 3.9 MMOL/L (ref 3.5–5.3)
RBC # BLD AUTO: 4.11 MILLION/UL (ref 3.81–5.12)
SODIUM SERPL-SCNC: 136 MMOL/L (ref 135–147)
WBC # BLD AUTO: 9.66 THOUSAND/UL (ref 4.31–10.16)

## 2025-01-28 PROCEDURE — 83735 ASSAY OF MAGNESIUM: CPT | Performed by: PHYSICIAN ASSISTANT

## 2025-01-28 PROCEDURE — 82948 REAGENT STRIP/BLOOD GLUCOSE: CPT

## 2025-01-28 PROCEDURE — 99232 SBSQ HOSP IP/OBS MODERATE 35: CPT

## 2025-01-28 PROCEDURE — 85025 COMPLETE CBC W/AUTO DIFF WBC: CPT | Performed by: PHYSICIAN ASSISTANT

## 2025-01-28 PROCEDURE — 80048 BASIC METABOLIC PNL TOTAL CA: CPT | Performed by: PHYSICIAN ASSISTANT

## 2025-01-28 PROCEDURE — 87205 SMEAR GRAM STAIN: CPT

## 2025-01-28 PROCEDURE — 0X9J0ZZ DRAINAGE OF RIGHT HAND, OPEN APPROACH: ICD-10-PCS | Performed by: PHYSICIAN ASSISTANT

## 2025-01-28 PROCEDURE — 10060 I&D ABSCESS SIMPLE/SINGLE: CPT | Performed by: PHYSICIAN ASSISTANT

## 2025-01-28 PROCEDURE — 99244 OFF/OP CNSLTJ NEW/EST MOD 40: CPT | Performed by: PHYSICIAN ASSISTANT

## 2025-01-28 PROCEDURE — 87186 SC STD MICRODIL/AGAR DIL: CPT

## 2025-01-28 PROCEDURE — 73201 CT UPPER EXTREMITY W/DYE: CPT

## 2025-01-28 PROCEDURE — 87077 CULTURE AEROBIC IDENTIFY: CPT

## 2025-01-28 PROCEDURE — 87070 CULTURE OTHR SPECIMN AEROBIC: CPT

## 2025-01-28 RX ORDER — LIDOCAINE HYDROCHLORIDE 10 MG/ML
5 INJECTION, SOLUTION EPIDURAL; INFILTRATION; INTRACAUDAL; PERINEURAL ONCE
Status: COMPLETED | OUTPATIENT
Start: 2025-01-28 | End: 2025-01-28

## 2025-01-28 RX ADMIN — CEFAZOLIN SODIUM 1000 MG: 1 SOLUTION INTRAVENOUS at 05:49

## 2025-01-28 RX ADMIN — INSULIN LISPRO 3 UNITS: 100 INJECTION, SOLUTION INTRAVENOUS; SUBCUTANEOUS at 17:20

## 2025-01-28 RX ADMIN — ROPINIROLE HYDROCHLORIDE 1 MG: 1 TABLET, FILM COATED ORAL at 21:44

## 2025-01-28 RX ADMIN — IOHEXOL 100 ML: 350 INJECTION, SOLUTION INTRAVENOUS at 13:49

## 2025-01-28 RX ADMIN — OXYCODONE HYDROCHLORIDE 5 MG: 5 TABLET ORAL at 23:12

## 2025-01-28 RX ADMIN — CEFAZOLIN SODIUM 1000 MG: 1 SOLUTION INTRAVENOUS at 21:41

## 2025-01-28 RX ADMIN — OXYCODONE HYDROCHLORIDE 5 MG: 5 TABLET ORAL at 16:17

## 2025-01-28 RX ADMIN — GABAPENTIN 600 MG: 300 CAPSULE ORAL at 07:57

## 2025-01-28 RX ADMIN — GABAPENTIN 600 MG: 300 CAPSULE ORAL at 21:43

## 2025-01-28 RX ADMIN — ENOXAPARIN SODIUM 40 MG: 40 INJECTION SUBCUTANEOUS at 07:57

## 2025-01-28 RX ADMIN — INSULIN LISPRO 1 UNITS: 100 INJECTION, SOLUTION INTRAVENOUS; SUBCUTANEOUS at 21:44

## 2025-01-28 RX ADMIN — GABAPENTIN 600 MG: 300 CAPSULE ORAL at 15:19

## 2025-01-28 RX ADMIN — HYDROMORPHONE HYDROCHLORIDE 0.5 MG: 1 INJECTION, SOLUTION INTRAMUSCULAR; INTRAVENOUS; SUBCUTANEOUS at 12:08

## 2025-01-28 RX ADMIN — CEFAZOLIN SODIUM 1000 MG: 1 SOLUTION INTRAVENOUS at 13:25

## 2025-01-28 RX ADMIN — OXYCODONE HYDROCHLORIDE 5 MG: 5 TABLET ORAL at 07:57

## 2025-01-28 RX ADMIN — HYDROMORPHONE HYDROCHLORIDE 0.5 MG: 1 INJECTION, SOLUTION INTRAMUSCULAR; INTRAVENOUS; SUBCUTANEOUS at 17:30

## 2025-01-28 RX ADMIN — LORATADINE 10 MG: 10 TABLET ORAL at 21:44

## 2025-01-28 RX ADMIN — PANTOPRAZOLE SODIUM 40 MG: 20 TABLET, DELAYED RELEASE ORAL at 05:49

## 2025-01-28 RX ADMIN — INSULIN LISPRO 3 UNITS: 100 INJECTION, SOLUTION INTRAVENOUS; SUBCUTANEOUS at 11:03

## 2025-01-28 RX ADMIN — LIDOCAINE HYDROCHLORIDE 5 ML: 10 INJECTION, SOLUTION EPIDURAL; INFILTRATION; INTRACAUDAL; PERINEURAL at 15:18

## 2025-01-28 NOTE — CONSULTS
Consultation - Orthopedics   Name: Loreto Taylor 59 y.o. female I MRN: [id number]  Unit/Bed#: -01 I Date of Admission: 1/27/2025   Date of Service: 1/28/2025 I Hospital Day: 0   Inpatient consult to Orthopedic Surgery  Consult performed by: Daniela Man PA-C  Consult ordered by: Anna Reyna PA-C        Physician Requesting Evaluation: Catalino Berger DO   Reason for Evaluation / Principal Problem: right hand cellulitis.     Assessment & Plan  Cellulitis of right hand  Suspect cellulitis of the right hand/forearm.   Will await CT.  No immediate hand surgery intervention at this time.   Antibiotics per primary team.   Pain control per primary team.   DVT ppx per primary team.   Medical management per primary team.     I have discussed the above management plan in detail with the primary service.   Orthopedics service will follow.  Please contact the SecureChat role for the Orthopedics service with any questions/concerns.  Case reviewed and discussed with Dr. Morfin.    History of Present Illness   HPI: Loreto Taylor is a 59 y.o. year old female right hand dominant female with history of restless leg syndrome, Giordano syndrome, DM, also with history of right handed carpal tunnel syndrome, CMC arthritis, right thumb trigger finger, who follows with outside orthopedics group and manages pain with steroid injections (last injection provided on 1/9/2025), who presents for evaluation of right hand pain and erythema that started on Saturday. She notes no trauma or injury to the right hand. She was seen in urgent care, provided keflex for cellulitis, and when this did not improve her symptoms, sought care in the ER. She has been started on ancef.  Overnight it has been reported that her erythema has worsened and thus hand surgery is now engaged.   At time of consultation she endorses that erythema over the distal forearm has receeded, but the erythema over the dorsal hand has come out of the  outline that was marked. She has no wrist pain. Finds that the pain she has is over the dorsum of the thumb, and that the range of motion of her thumb is limited. She has no pain in any other digits on the right hand. She is able to move all digits, and feels that the range of motion of her thumb is limited by stiffness.   No other complaints at this time.   Otherwise feels in her regular state of health.   No family at bedside.     Review of Systems   Musculoskeletal:  Positive for arthralgias, joint swelling and myalgias.   All other systems reviewed and are negative.   significant for findings described in the HPI.  I have reviewed the patient's PMH, PSH, Social History, Family History, Meds, and Allergies    Objective :  Temp:  [98.2 °F (36.8 °C)-98.6 °F (37 °C)] 98.2 °F (36.8 °C)  HR:  [] 100  BP: (130-138)/(64-79) 130/74  Resp:  [16-17] 17  SpO2:  [97 %-98 %] 97 %  O2 Device: None (Room air)  Physical Exam  Vitals and nursing note reviewed.   Constitutional:       Appearance: Normal appearance.   HENT:      Head: Normocephalic and atraumatic.      Nose: Nose normal.      Mouth/Throat:      Mouth: Mucous membranes are moist.      Pharynx: Oropharynx is clear.   Eyes:      Extraocular Movements: Extraocular movements intact.      Conjunctiva/sclera: Conjunctivae normal.      Pupils: Pupils are equal, round, and reactive to light.   Cardiovascular:      Rate and Rhythm: Normal rate.      Pulses: Normal pulses.   Pulmonary:      Effort: Pulmonary effort is normal.   Musculoskeletal:      Comments: Musculoskeletal: right upper extremity  Skin dry and intact with exception of small abrasions of the dorsal thumb base. Please see wound images for full characterization.   There is erythema noted over the dorsal hand that has been outlined. Appears to be receeding over the distal arm.   She has pain to palpation over the distal thumb with skin sensitivity.   She is able to range all digits, thumb limited by  "participation and swelling.   No pain with flexion/extension of the wrist.   No micromotion ttp to any digit or wrist.   Motor intact to radial, ulnar, median, musculocutaneous, and axillary nerve distributions  SILT m/r/u.   Motor intact ain/pin/m/r/u  2+ radial and ulnar pulse  Musculature is soft and compressible, no pain with passive stretch   Skin:     General: Skin is warm.      Capillary Refill: Capillary refill takes less than 2 seconds.      Findings: Erythema present.   Neurological:      General: No focal deficit present.      Mental Status: She is alert and oriented to person, place, and time.   Psychiatric:         Mood and Affect: Mood normal.         Behavior: Behavior normal.         Thought Content: Thought content normal.         Judgment: Judgment normal.                          Lab Results: I have reviewed the following results:   Recent Labs     01/26/25 2129 01/28/25  0444   WBC 10.89* 9.66   HGB 14.5 12.4   HCT 44.3 37.6    229   BUN 15 12   CREATININE 0.66 0.47*   PTT 23  --    INR 0.86  --      Blood Culture:   Lab Results   Component Value Date    BLOODCX No Growth at 24 hrs. 01/27/2025     Wound Culture: No results found for: \"WOUNDCULT\"    Imaging Results Review: I personally reviewed the following image studies in PACS and associated radiology reports: XRAY right hand. My interpretation of the radiology images/reports is: no acute osseous abnormality. Arthritic changes.  Other Study Results Review: No additional pertinent studies reviewed.  "

## 2025-01-28 NOTE — ASSESSMENT & PLAN NOTE
Lab Results   Component Value Date    HGBA1C 8.8 (H) 12/23/2024       Recent Labs     01/27/25  1335 01/27/25  1710 01/27/25 2050 01/28/25  0642   POCGLU 195* 201* 206* 136       Blood Sugar Average: Last 72 hrs:  (P) 167  Hold home oral meds   Start SSI with accuchecks   Avoid hypoglycemia

## 2025-01-28 NOTE — PLAN OF CARE
Problem: INFECTION - ADULT  Goal: Absence or prevention of progression during hospitalization  Description: INTERVENTIONS:  - Assess and monitor for signs and symptoms of infection  - Monitor lab/diagnostic results  - Monitor all insertion sites, i.e. indwelling lines, tubes, and drains  - Monitor endotracheal if appropriate and nasal secretions for changes in amount and color  - [place] appropriate cooling/warming therapies per order  - Administer medications as ordered  - Instruct and encourage patient and family to use good hand hygiene technique  - Identify and instruct in appropriate isolation precautions for identified infection/condition  Outcome: Progressing  Goal: Absence of fever/infection during neutropenic period  Description: INTERVENTIONS:  - Monitor WBC    Outcome: Progressing

## 2025-01-28 NOTE — PROGRESS NOTES
Progress Note - Hospitalist   Name: Loreto Taylor 59 y.o. female I MRN: [id number]  Unit/Bed#: -01 I Date of Admission: 1/27/2025   Date of Service: 1/28/2025 I Hospital Day: 0    Assessment & Plan  Cellulitis of right hand  Patient with history of arthritis of CMC joint of right thumb, De Quervain's disease, carpal tunnel syndrome, right thumb trigger finger presents with acute onset pain, swelling, redness of right hand below right thumb extending up the wrist to the forearm  Follows with [facility] ortho -- s/p recent multiple steroid injections to the hands on 01/09/25 including of right thumb DQ, bilateral CMC CSI, bilateral MCPJ CSI.  Went to urgent care yesterday due to right hand swelling, redness, pain and was prescribed keflex for cellulitis. Reports that the redness is spreading up her forearm now.   Fortunately patient remains afebrile without sepsis criteria.   IV Ancef on day #2  Formal x-ray unremarkable, arthritis  PRN analgesia   Uric acid normal  Blood cultures negative x 2  Follow-up CT hand  Follow-up formal Ortho recommendations    Restless legs syndrome  Continue requip   Giordano syndrome  History noted  Type 2 diabetes mellitus (HCC)  Lab Results   Component Value Date    HGBA1C 8.8 (H) 12/23/2024       Recent Labs     01/27/25  1335 01/27/25  1710 01/27/25  2050 01/28/25  0642   POCGLU 195* 201* 206* 136       Blood Sugar Average: Last 72 hrs:  (P) 167  Hold home oral meds   Start SSI with accuchecks   Avoid hypoglycemia     VTE Pharmacologic Prophylaxis: VTE Score: 3 Moderate Risk (Score 3-4) - Pharmacological DVT Prophylaxis Ordered: enoxaparin (Lovenox).    Mobility:   Basic Mobility Inpatient Raw Score: 24  JH-HLM Goal: 8: Walk 250 feet or more  JH-HLM Achieved: 8: Walk 250 feet ot more  JH-HLM Goal achieved. Continue to encourage appropriate mobility.    Patient Centered Rounds: I performed bedside rounds with nursing staff today.   Discussions with Specialists or Other Care Team  Provider: Orthopedics, CM, RN    Education and Discussions with Family / Patient: Patient declined call to .     Current Length of Stay: 0 day(s)  Current Patient Status: Observation   Certification Statement: The patient will continue to require additional inpatient hospital stay due to IV antibiotics, orthopedics evaluation  Discharge Plan: Anticipate discharge in 24-48 hrs to home.    Code Status: Level 1 - Full Code    Subjective   Patient only slightly better than yesterday still having 8/10 pain of her right thumb.  Notes that the redness has spread laterally.  Denies fever, chills, nausea or vomiting    Objective :  Temp:  [98.2 °F (36.8 °C)-98.6 °F (37 °C)] 98.2 °F (36.8 °C)  HR:  [] 100  BP: (130-138)/(64-79) 130/74  Resp:  [16-17] 17  SpO2:  [97 %-98 %] 97 %  O2 Device: None (Room air)    Body mass index is 32.89 kg/m².     Input and Output Summary (last 24 hours):     Intake/Output Summary (Last 24 hours) at 1/28/2025 1055  Last data filed at 1/27/2025 1900  Gross per 24 hour   Intake 290 ml   Output --   Net 290 ml       Physical Exam  Vitals reviewed.   Constitutional:       General: She is not in acute distress.     Appearance: Normal appearance. She is not toxic-appearing.   Cardiovascular:      Rate and Rhythm: Normal rate and regular rhythm.      Heart sounds: Normal heart sounds.   Pulmonary:      Effort: Pulmonary effort is normal.      Breath sounds: Normal breath sounds.   Abdominal:      Palpations: Abdomen is soft.      Tenderness: There is no abdominal tenderness.   Skin:     General: Skin is warm and dry.      Comments: Right hand/CMC joint with diffuse erythema, edema, warm and tender to touch, no fluctuance, some pain with passive ROM of the right thumb.  Other fingers ROM intact   Neurological:      Mental Status: She is alert and oriented to person, place, and time.           Lines/Drains:              Lab Results: I have reviewed the following results:   Results from  last 7 days   Lab Units 01/28/25  0444   WBC Thousand/uL 9.66   HEMOGLOBIN g/dL 12.4   HEMATOCRIT % 37.6   PLATELETS Thousands/uL 229   SEGS PCT % 72   LYMPHO PCT % 20   MONO PCT % 7   EOS PCT % 0     Results from last 7 days   Lab Units 01/28/25  0444 01/26/25  2129   SODIUM mmol/L 136 138   POTASSIUM mmol/L 3.9 3.8   CHLORIDE mmol/L 102 102   CO2 mmol/L 26 28   BUN mg/dL 12 15   CREATININE mg/dL 0.47* 0.66   ANION GAP mmol/L 8 8   CALCIUM mg/dL 8.5 9.2   ALBUMIN g/dL  --  4.0   TOTAL BILIRUBIN mg/dL  --  1.01*   ALK PHOS U/L  --  85   ALT U/L  --  20   AST U/L  --  20   GLUCOSE RANDOM mg/dL 154* 211*     Results from last 7 days   Lab Units 01/26/25  2129   INR  0.86     Results from last 7 days   Lab Units 01/28/25  0642 01/27/25  2050 01/27/25  1710 01/27/25  1335 01/27/25  0915   POC GLUCOSE mg/dl 136 206* 201* 195* 97         Results from last 7 days   Lab Units 01/26/25  2129   LACTIC ACID mmol/L 1.1   PROCALCITONIN ng/ml <0.05       Recent Cultures (last 7 days):   Results from last 7 days   Lab Units 01/27/25  0332 01/26/25  2135   BLOOD CULTURE  Received in Microbiology Lab. Culture in Progress. No Growth at 24 hrs.             Last 24 Hours Medication List:     Current Facility-Administered Medications:     acetaminophen (TYLENOL) tablet 650 mg, Q6H PRN    ceFAZolin (ANCEF) IVPB (premix in dextrose) 1,000 mg 50 mL, Q8H, Last Rate: 1,000 mg (01/28/25 0549)    cyclobenzaprine (FLEXERIL) tablet 10 mg, TID PRN    enoxaparin (LOVENOX) subcutaneous injection 40 mg, Daily    gabapentin (NEURONTIN) capsule 600 mg, TID    HYDROmorphone (DILAUDID) injection 0.5 mg, Q4H PRN    insulin lispro (HumALOG/ADMELOG) 100 units/mL subcutaneous injection 1-5 Units, TID AC **AND** Fingerstick Glucose (POCT), TID AC    insulin lispro (HumALOG/ADMELOG) 100 units/mL subcutaneous injection 1-5 Units, HS    ketorolac (TORADOL) injection 15 mg, Q6H PRN    loratadine (CLARITIN) tablet 10 mg, Daily    oxyCODONE (ROXICODONE) IR tablet  5 mg, Q6H PRN    pantoprazole (PROTONIX) EC tablet 40 mg, Daily Before Breakfast    rOPINIRole (REQUIP) tablet 1 mg, HS    Administrative Statements   Today, Patient Was Seen By: Anna Reyna PA-C      **Please Note: This note may have been constructed using a voice recognition system.**

## 2025-01-28 NOTE — QUICK NOTE
Brief orthopedics note:    Reviewed CT of the hand with orthopedic hand attg, early abscess formation noted. Discussed with radiology.   Discussed findings with primary team and patient.     Procedure: bedside incision/drainage  Patient was prepped/draped in the usual sterile fashion. Local analgesia was given with 5cc 1% lidocaine. A 1 cm incision was made with an 11 blade scalpel to the dorsum of the MCP of the right thumb.  [place] purulence was noted.  Cultures were obtained.  The incision/wound was copiously irrigated with sterile saline. Sterile dressings were applied. Patient tolerated procedure well and was neurovascularly intact pre and post procedure.     Plan:   Will perform wound check in AM  Follow wound cultures. .  Antibiotics per primary team.   Likely will start soapy soaks in AM.   Rest of care per primary team.     Daniela Man PA-C

## 2025-01-28 NOTE — QUICK NOTE
CT of right hand with signs of early forming abscess  Bedside I&D with orthopedics, purulent drainage noted from area  Patient tolerated procedure well  Wrapped with clean gauze  Follow-up wound culture and Gram stain x 2

## 2025-01-28 NOTE — ASSESSMENT & PLAN NOTE
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  Went to urgent care yesterday due to right hand swelling, redness, pain and was prescribed keflex for cellulitis. Reports that the redness is spreading up her forearm now.   Fortunately patient remains afebrile without sepsis criteria.   IV Ancef on day #2  Formal x-ray unremarkable, arthritis  PRN analgesia   Uric acid normal  Blood cultures negative x 2  Follow-up CT hand  Follow-up formal Ortho recommendations

## 2025-01-28 NOTE — ASSESSMENT & PLAN NOTE
Suspect cellulitis of the right hand/forearm.   Will await CT.  No immediate hand surgery intervention at this time.   Antibiotics per primary team.   Pain control per primary team.   DVT ppx per primary team.   Medical management per primary team.

## 2025-01-28 NOTE — UTILIZATION REVIEW
Initial Clinical Review  OBSERVATION  1/27/25 @ 0401 CONVERTED TO INPATIENT ADMISSION 1/29/25 @0818 DUE TO CONTINUED STAY REQUIRED TO EVALUATE AND TREAT PATIENT WITH R HAND CELLULITIS S/P  I AND D WITH IV ABX, PAIN CONTROL .        Admission: Date/Time/Statement:   Admission Orders (From admission, onward)       Ordered        01/29/25 0818  INPATIENT ADMISSION  Once            01/27/25 0401  Place in Observation  Once                          Orders Placed This Encounter   Procedures    INPATIENT ADMISSION     Standing Status:   Standing     Number of Occurrences:   1     Level of Care:   Med Surg [16]     Estimated length of stay:   More than 2 Midnights     Certification:   I certify that inpatient services are medically necessary for this patient for a duration of greater than two midnights. See H&P and MD Progress Notes for additional information about the patient's course of treatment.     ED Arrival Information       Expected   -    Arrival   1/26/2025 20:30    Acuity   Urgent              Means of arrival   Walk-In    Escorted by   Family Member    Service   Hospitalist    Admission type   Emergency              Arrival complaint   Hand pain             Chief Complaint   Patient presents with    Hand Swelling     Patient comes in reporting R hand pain / swelling which began yesterday. States she went to urgent care and was told to come to ED if worsening redness or swelling. States she marked redness with skin marker and redness has spread. Rates pain 10/10. Started on keflex today, states no improvement.        Initial Presentation: 59 y.o. 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 She saw urgent  care yesterday who prescribed Keflex, came to the ER today due to rapidly spreading redness she noted streaking up hr wrist to her forearm . On exam, erythema, tenderness, warmth, swelling on the dorsal aspect of the right hand isolated to the base of the right thumb extending to the wrist and slightly up the forearm streaking  .  Labs : WBC 10.89. XR shows arthritic changes. Pt given IV analgesics, IV abx in ED. Admitted as OBS with cellulitis R hand . Plan- IV Ancef. F/U blood cx . F/U formal read XR R hand ,. Pain control. Check uric acid . Start SSI with accuchecks.If any worsening can obtain CT and consult hand surgery   Anticipated Length of Stay/Certification Statement: Patient will be admitted on an observation basis with an anticipated length of stay of less than 2 midnights secondary to right hand cellulitis.     Date:1/28   Patient only slightly better than yesterday still having 8/10 pain of her right thumb. Notes that the redness has spread laterally. Right hand/CMC joint with diffuse erythema, edema, warm and tender to touch, no fluctuance, some pain with passive ROM of the right thumb.  Other fingers ROM intact . Uric acid normal. Blood cx neg x 2 . CT hand ordered today as well as ortho consult . Continue IV Ancef .     Ortho consult - erythema over the distal forearm has receeded, but the erythema over the dorsal hand has come out of the outline that was marked. She has no wrist pain. Finds that the pain she has is over the dorsum of the thumb, and that the range of motion of her thumb is limited. Suspect cellulitis of the right hand/forearm.  Will await CT. . No immediate hand surgery intervention at this time. IV abx per primary team   Update - CT of the hand shows  early abscess formation noted . Bedside I and D performed by ortho to the dorsum of the MCP of the right thumb. Peterson purulence was noted. Cultures were obtained. The incision/wound was copiously irrigated with sterile saline. Sterile  dressings were applied. Patient tolerated procedure well and was neurovascularly intact pre and post procedure. PLan- check wound in am . F/U wound cx. IV abx . Likely will start soapy soaks in AM      Date 1/29 Converted to IP    Pt continues on IV Ancef . F/U wound cx .  S/p I and D yesterday w/ purulent drainage  .   Pain in R hand 8-9/10 this am . Pt receiving prn IV Dilaudid, prn Oxycodone for  pain . Erythema improving R hand , no further purulent drainage , decreased edema  R hand . Right hand wrapped in clean gauze with Ace bandage surrounding. Fingers and thumb neurovascular intact, ROM improved from days prior, forearm erythema has receded  .+2 radial pulse, Motor and sensation intact .  Per ortho, start BID soapy soaks x 15 minutes, rinse, cover with gauze/ananda .    Pt will continue to require additional inpatient hospital stay due to IV antibiotics, finalization of wound cultures .    Date 1/30 Day 2    Wound eval by ortho this am, improved erythema.No drainage appreciated .   Per ortho, pain has improved from admission  with pt receiving prn IV Dilaudid and prn Oxycodone for pain 9/10. Cx show Staph aureus . Continue BID soapy soaks . IV Ancef .         1/27 R hand       1/27 R hand vs L hand       1/28 R hand       1/28 R hand     1/28 R hand       1/29 R hand      1/29 R hand     1/30 R hand       1/30 R hand           ED Treatment-Medication Administration from 01/26/2025 2030 to 01/27/2025 1613         Date/Time Order Dose Route Action     01/27/2025 0237 ketorolac (TORADOL) injection 15 mg 15 mg Intravenous Given     01/27/2025 0402 ceFAZolin (ANCEF) IVPB (premix in dextrose) 2,000 mg 50 mL 2,000 mg Intravenous New Bag     01/27/2025 0506 oxyCODONE (ROXICODONE) IR tablet 5 mg 5 mg Oral Given     01/27/2025 1137 HYDROmorphone (DILAUDID) injection 0.5 mg 0.5 mg Intravenous Given     01/27/2025 1004 pantoprazole (PROTONIX) EC tablet 40 mg 40 mg Oral Given     01/27/2025 1004 gabapentin (NEURONTIN)  capsule 600 mg 600 mg Oral Given     01/27/2025 1004 enoxaparin (LOVENOX) subcutaneous injection 40 mg 40 mg Subcutaneous Given     01/27/2025 1138 ceFAZolin (ANCEF) IVPB (premix in dextrose) 1,000 mg 50 mL 1,000 mg Intravenous New Bag     01/27/2025 1403 insulin lispro (HumALOG/ADMELOG) 100 units/mL subcutaneous injection 1-5 Units 1 Units Subcutaneous Given            Scheduled Medications:  cefazolin, 1,000 mg, Intravenous, Q8H  enoxaparin, 40 mg, Subcutaneous, Daily  gabapentin, 600 mg, Oral, TID  insulin lispro, 1-5 Units, Subcutaneous, TID AC  insulin lispro, 1-5 Units, Subcutaneous, HS  loratadine, 10 mg, Oral, Daily  pantoprazole, 40 mg, Oral, Daily Before Breakfast  rOPINIRole, 1 mg, Oral, HS      Continuous IV Infusions:     PRN Meds:  acetaminophen, 650 mg, Oral, Q6H PRN  cyclobenzaprine, 10 mg, Oral, TID PRN  HYDROmorphone, 0.5 mg, Intravenous, Q4H PRNx1 1/27, x 2 1/28, x2 1/29 , x1 1/30   ketorolac, 15 mg, Intravenous, Q6H PRN  oxyCODONE, 5 mg, Oral, Q6H PRN x1 1/27, x 3 1/28 , x3 1/29       ED Triage Vitals   Temperature Pulse Respirations Blood Pressure SpO2 Pain Score   01/26/25 2119 01/26/25 2122 01/26/25 2122 01/26/25 2122 01/26/25 2122 01/27/25 0236   98.3 °F (36.8 °C) 101 20 160/99 98 % 10 - Worst Possible Pain     Weight (last 2 days)       None            Vital Signs (last 3 days)       Date/Time Temp Pulse Resp BP MAP (mmHg) SpO2 O2 Device Patient Position - Orthostatic VS Pain    01/30/25 0841 -- -- -- -- -- -- -- -- 9    01/30/25 06:51:44 97.6 °F (36.4 °C) 85 18 151/93 112 97 % -- -- --    01/29/25 22:57:57 98.2 °F (36.8 °C) 98 16 149/95 113 90 % -- -- --    01/29/25 22:27:03 97.9 °F (36.6 °C) 98 16 157/85 109 95 % -- -- --    01/29/25 2217 -- -- -- -- -- -- -- -- 9 01/29/25 2034 -- -- -- -- -- -- -- -- 8 01/29/25 2000 -- -- -- -- -- -- -- -- 9 01/29/25 15:36:37 98.3 °F (36.8 °C) 108 16 157/84 108 94 % -- -- --    01/29/25 1401 -- -- -- -- -- -- -- -- 7    01/29/25 0828 -- -- -- --  -- -- -- -- 9    01/29/25 06:56:46 98.4 °F (36.9 °C) 85 18 136/81 99 97 % -- -- --    01/29/25 0612 -- -- -- -- -- -- -- -- 8    01/28/25 23:17:03 98.6 °F (37 °C) 101 16 137/95 109 82 % -- -- --    01/28/25 2312 -- -- -- -- -- -- -- -- 7 01/28/25 2141 -- -- -- -- -- -- -- -- 7    01/28/25 1730 -- -- -- -- -- -- -- -- 10 - Worst Possible Pain    01/28/25 1617 -- -- -- -- -- -- -- -- 10 - Worst Possible Pain    01/28/25 15:06:58 98.8 °F (37.1 °C) 105 18 148/89 109 96 % -- -- --    01/28/25 1208 -- -- -- -- -- -- -- -- 10 - Worst Possible Pain    01/28/25 1207 -- 93 -- 148/70 100 96 % -- -- --    01/28/25 0757 -- -- -- -- -- -- -- -- 8    01/28/25 0700 98.2 °F (36.8 °C) -- -- 130/74 -- -- -- Lying --    01/27/25 2226 98.6 °F (37 °C) 100 17 133/64 92 97 % None (Room air) Lying --    01/27/25 2115 -- -- -- -- -- -- -- -- 10 - Worst Possible Pain    01/27/25 2100 -- -- -- -- -- -- -- -- 10 - Worst Possible Pain    01/27/25 1750 -- -- -- -- -- -- -- -- 10 - Worst Possible Pain    01/27/25 1500 98.3 °F (36.8 °C) 99 16 138/79 104 97 % -- -- --    01/27/25 1338 -- 102 16 131/66 93 98 % None (Room air) Sitting 7    01/27/25 1137 -- -- -- -- -- -- -- -- 9    01/27/25 0726 97.6 °F (36.4 °C) 84 16 124/60 82 97 % None (Room air) Sitting --    01/27/25 0537 -- -- -- -- -- -- -- -- 9    01/27/25 0506 -- -- -- -- -- -- -- -- 10 - Worst Possible Pain    01/27/25 0237 -- -- -- -- -- -- -- -- 10 - Worst Possible Pain    01/27/25 0236 -- 96 20 121/64 87 99 % None (Room air) -- 10 - Worst Possible Pain              Pertinent Labs/Diagnostic Test Results:   Radiology:  CT upper extremity w contrast right   Final Interpretation by Deborah Wolfe MD (01/28 1009)      Partially organized subcutaneous fluid collection along the radial aspect of the first metacarpal bone, suspicious for developing abscess.               I personally discussed this study with GWEN ABRAHAM on 1/28/2025 at 3:01 PM.               Workstation  performed: [id number]         XR hand 3+ views RIGHT   ED Interpretation by Pamella Barr PA-C (01/27 0349)   No osteomyelitis or fractures per my read      Final Interpretation by Mylene Overton MD (01/27 1026)      No acute displaced fracture or dislocation      Arthritic changes at the scaphoid trapezial and scaphoid trapezial joint. First carpometacarpal arthritis   Computerized Assisted Algorithm (CAA) may have been used to analyze all applicable images.         Workstation performed: [id number]           Cardiology:  No orders to display     GI:  No orders to display           Results from last 7 days   Lab Units 01/30/25  0453 01/29/25  0521 01/28/25  0444 01/26/25 2129   WBC Thousand/uL 4.57 6.07 9.66 10.89*   HEMOGLOBIN g/dL 11.8 12.0 12.4 14.5   HEMATOCRIT % 35.9 35.6 37.6 44.3   PLATELETS Thousands/uL 224 211 229 289   TOTAL NEUT ABS Thousands/µL  --   --  6.98 8.33*         Results from last 7 days   Lab Units 01/29/25  0521 01/28/25 0444 01/26/25 2129   SODIUM mmol/L 136 136 138   POTASSIUM mmol/L 3.9 3.9 3.8   CHLORIDE mmol/L 103 102 102   CO2 mmol/L 26 26 28   ANION GAP mmol/L 7 8 8   BUN mg/dL 10 12 15   CREATININE mg/dL 0.40* 0.47* 0.66   EGFR ml/min/1.73sq m 115 109 97   CALCIUM mg/dL 8.7 8.5 9.2   MAGNESIUM mg/dL  --  2.2  --      Results from last 7 days   Lab Units 01/26/25 2129   AST U/L 20   ALT U/L 20   ALK PHOS U/L 85   TOTAL PROTEIN g/dL 6.8   ALBUMIN g/dL 4.0   TOTAL BILIRUBIN mg/dL 1.01*     Results from last 7 days   Lab Units 01/30/25  0650 01/29/25  2114 01/29/25  1616 01/29/25  1058 01/29/25  0654 01/28/25  2059 01/28/25  1559 01/28/25  1103 01/28/25  1059 01/28/25  1051 01/28/25  0642 01/27/25 2050   POC GLUCOSE mg/dl 150* 281* 273* 234* 171* 156* 281* 298* 283* 324* 136 206*     Results from last 7 days   Lab Units 01/29/25  0521 01/28/25 0444 01/26/25 2129   GLUCOSE RANDOM mg/dL 194* 154* 211*               Results from last 7 days   Lab Units 01/26/25 2129    PROTIME seconds 12.4   INR  0.86   PTT seconds 23         Results from last 7 days   Lab Units 01/26/25  2129   PROCALCITONIN ng/ml <0.05     Results from last 7 days   Lab Units 01/26/25 2129   LACTIC ACID mmol/L 1.1               Results from last 7 days   Lab Units 01/26/25  2251   CLARITY UA  Clear   COLOR UA  Light Yellow   SPEC GRAV UA  1.043*   PH UA  6.0   GLUCOSE UA mg/dl >=1000 (1%)*   KETONES UA mg/dl Negative   BLOOD UA  Negative   PROTEIN UA mg/dl Negative   NITRITE UA  Negative   BILIRUBIN UA  Negative   UROBILINOGEN UA (BE) mg/dl <2.0   LEUKOCYTES UA  Elevated glucose may cause decreased leukocyte values. See urine microscopic for UWBC result*   WBC UA /hpf 1-2   RBC UA /hpf None Seen   BACTERIA UA /hpf None Seen   EPITHELIAL CELLS WET PREP /hpf None Seen                                 Results from last 7 days   Lab Units 01/28/25  1532 01/27/25  0332 01/26/25  2135   BLOOD CULTURE   --  No Growth at 48 hrs. No Growth at 72 hrs.   GRAM STAIN RESULT  No Polys or Bacteria seen  3+ Polys*  3+ Gram positive cocci in pairs*  --   --    WOUND CULTURE  1+ Growth of Staphylococcus aureus*  1+ Growth of Staphylococcus aureus*  --   --                    Past Medical History:   Diagnosis Date    Diabetes mellitus (HCC)     Gastroparesis     Giordano's syndrome      Present on Admission:   Type 2 diabetes mellitus (HCC)   Restless legs syndrome      Admitting Diagnosis: Hand swelling [M79.89]  Cellulitis of right hand [L03.113]  Cellulitis of right wrist [L03.113]  Age/Sex: 59 y.o. female    Network Utilization Review Department  ATTENTION: Please call with any questions or concerns to [phone] and carefully listen to the prompts so that you are directed to the right person. All voicemails are confidential.   For Discharge needs, contact Care Management DC Support Team at [phone] opt. [phone]  Send all requests for admission clinical reviews, approved or denied determinations and any other requests to  dedicated fax number below belonging to the campus where the patient is receiving treatment. List of dedicated fax numbers for the Facilities:  FACILITY NAME UR FAX NUMBER   ADMISSION DENIALS (Administrative/Medical Necessity) [phone]   DISCHARGE SUPPORT TEAM (NETWORK) [phone]   PARENT CHILD HEALTH (Maternity/NICU/Pediatrics) [phone]   [facility] [phone]   [facility] [phone]   [facility] [phone]   [facility] [phone]   [facility] [phone]   [facility] [phone]   [facility] [phone]   [facility] [phone]   [facility] [phone]   [facility] [phone]   [facility] [phone]   [facility] [phone]

## 2025-01-29 LAB
ANION GAP SERPL CALCULATED.3IONS-SCNC: 7 MMOL/L (ref 4–13)
BUN SERPL-MCNC: 10 MG/DL (ref 5–25)
CALCIUM SERPL-MCNC: 8.7 MG/DL (ref 8.4–10.2)
CHLORIDE SERPL-SCNC: 103 MMOL/L (ref 96–108)
CO2 SERPL-SCNC: 26 MMOL/L (ref 21–32)
CREAT SERPL-MCNC: 0.4 MG/DL (ref 0.6–1.3)
ERYTHROCYTE [DISTWIDTH] IN BLOOD BY AUTOMATED COUNT: 12.9 % (ref 11.6–15.1)
GFR SERPL CREATININE-BSD FRML MDRD: 115 ML/MIN/1.73SQ M
GLUCOSE SERPL-MCNC: 171 MG/DL (ref 65–140)
GLUCOSE SERPL-MCNC: 194 MG/DL (ref 65–140)
GLUCOSE SERPL-MCNC: 234 MG/DL (ref 65–140)
GLUCOSE SERPL-MCNC: 273 MG/DL (ref 65–140)
GLUCOSE SERPL-MCNC: 281 MG/DL (ref 65–140)
HCT VFR BLD AUTO: 35.6 % (ref 34.8–46.1)
HGB BLD-MCNC: 12 G/DL (ref 11.5–15.4)
MCH RBC QN AUTO: 30.8 PG (ref 26.8–34.3)
MCHC RBC AUTO-ENTMCNC: 33.7 G/DL (ref 31.4–37.4)
MCV RBC AUTO: 91 FL (ref 82–98)
PLATELET # BLD AUTO: 211 THOUSANDS/UL (ref 149–390)
PMV BLD AUTO: 9.4 FL (ref 8.9–12.7)
POTASSIUM SERPL-SCNC: 3.9 MMOL/L (ref 3.5–5.3)
RBC # BLD AUTO: 3.9 MILLION/UL (ref 3.81–5.12)
SODIUM SERPL-SCNC: 136 MMOL/L (ref 135–147)
WBC # BLD AUTO: 6.07 THOUSAND/UL (ref 4.31–10.16)

## 2025-01-29 PROCEDURE — 80048 BASIC METABOLIC PNL TOTAL CA: CPT

## 2025-01-29 PROCEDURE — 82948 REAGENT STRIP/BLOOD GLUCOSE: CPT

## 2025-01-29 PROCEDURE — 85027 COMPLETE CBC AUTOMATED: CPT

## 2025-01-29 PROCEDURE — 99232 SBSQ HOSP IP/OBS MODERATE 35: CPT

## 2025-01-29 PROCEDURE — 99024 POSTOP FOLLOW-UP VISIT: CPT | Performed by: PHYSICIAN ASSISTANT

## 2025-01-29 RX ADMIN — INSULIN LISPRO 3 UNITS: 100 INJECTION, SOLUTION INTRAVENOUS; SUBCUTANEOUS at 16:43

## 2025-01-29 RX ADMIN — GABAPENTIN 600 MG: 300 CAPSULE ORAL at 15:43

## 2025-01-29 RX ADMIN — CEFAZOLIN SODIUM 1000 MG: 1 SOLUTION INTRAVENOUS at 13:31

## 2025-01-29 RX ADMIN — OXYCODONE HYDROCHLORIDE 5 MG: 5 TABLET ORAL at 14:01

## 2025-01-29 RX ADMIN — ENOXAPARIN SODIUM 40 MG: 40 INJECTION SUBCUTANEOUS at 08:28

## 2025-01-29 RX ADMIN — INSULIN LISPRO 2 UNITS: 100 INJECTION, SOLUTION INTRAVENOUS; SUBCUTANEOUS at 12:09

## 2025-01-29 RX ADMIN — OXYCODONE HYDROCHLORIDE 5 MG: 5 TABLET ORAL at 20:34

## 2025-01-29 RX ADMIN — LORATADINE 10 MG: 10 TABLET ORAL at 21:00

## 2025-01-29 RX ADMIN — CEFAZOLIN SODIUM 1000 MG: 1 SOLUTION INTRAVENOUS at 21:00

## 2025-01-29 RX ADMIN — HYDROMORPHONE HYDROCHLORIDE 0.5 MG: 1 INJECTION, SOLUTION INTRAMUSCULAR; INTRAVENOUS; SUBCUTANEOUS at 22:17

## 2025-01-29 RX ADMIN — CEFAZOLIN SODIUM 1000 MG: 1 SOLUTION INTRAVENOUS at 06:37

## 2025-01-29 RX ADMIN — HYDROMORPHONE HYDROCHLORIDE 0.5 MG: 1 INJECTION, SOLUTION INTRAMUSCULAR; INTRAVENOUS; SUBCUTANEOUS at 08:28

## 2025-01-29 RX ADMIN — GABAPENTIN 600 MG: 300 CAPSULE ORAL at 21:00

## 2025-01-29 RX ADMIN — ROPINIROLE HYDROCHLORIDE 1 MG: 1 TABLET, FILM COATED ORAL at 21:00

## 2025-01-29 RX ADMIN — OXYCODONE HYDROCHLORIDE 5 MG: 5 TABLET ORAL at 06:12

## 2025-01-29 RX ADMIN — GABAPENTIN 600 MG: 300 CAPSULE ORAL at 08:28

## 2025-01-29 RX ADMIN — INSULIN LISPRO 3 UNITS: 100 INJECTION, SOLUTION INTRAVENOUS; SUBCUTANEOUS at 21:24

## 2025-01-29 RX ADMIN — INSULIN LISPRO 1 UNITS: 100 INJECTION, SOLUTION INTRAVENOUS; SUBCUTANEOUS at 07:52

## 2025-01-29 NOTE — ASSESSMENT & PLAN NOTE
Patient with superficial abscess over first MCP, s/p I&D at bedside yesterday.   Wound re-evaluated and re-irrigated this morning.   Pre francis cultures growing 3+ gram positive cocci in pairs. Follow up final cultures.   Antibiotics per primary team.   Will start BID soapy soaks x 15 minutes, rinse, cover with gauze/ananda. Discussed with nursing at bedside.   Pain control per primary team.   DVT ppx per primary team.   Medical management per primary team.   Case reviewed and discussed with Dr. Morfin.

## 2025-01-29 NOTE — PROGRESS NOTES
Progress Note - Orthopedics   Name: Loreto Taylor 59 y.o. female I MRN: [id number]  Unit/Bed#: W -01 I Date of Admission: 1/27/2025   Date of Service: 1/29/2025 I Hospital Day: 0    Assessment & Plan  Cellulitis of right hand  Patient with superficial abscess over first MCP, s/p I&D at bedside yesterday.   Wound re-evaluated and re-irrigated this morning.   Pre francis cultures growing 3+ gram positive cocci in pairs. Follow up final cultures.   Antibiotics per primary team.   Will start BID soapy soaks x 15 minutes, rinse, cover with gauze/ananda. Discussed with nursing at bedside.   Pain control per primary team.   DVT ppx per primary team.   Medical management per primary team.   Case reviewed and discussed with Dr. Morfin.     Orthopedics service will follow.  Please contact the SecureChat role for the Orthopedics service with any questions/concerns.    Subjective   59 y.o.female seen and examined at bedside. She has improved pain this morning. No other complaints.     Objective :  Temp:  [98.4 °F (36.9 °C)-98.8 °F (37.1 °C)] 98.4 °F (36.9 °C)  HR:  [] 85  BP: (136-148)/(70-95) 136/81  Resp:  [16-18] 18  SpO2:  [82 %-97 %] 97 %    Physical Exam  Musculoskeletal: rightupper  Please see wound images for full characterization of wound. Patient with ttp over incision site. She has some mild drainage. Re-irrigated at bedside and soapy soaks initiated.   Full range of motion of index, long, ring and small fingers.   Improved range of motion to thumb.   Erythema over distal forearm and hand appear improved.   Sensation intact to median/radial/ulnar nerve distribution   Motor intact anterior interosseous nerve/posterior interosseous nerve/median/radial/ulnar nerve distributions  2+ radial pulse                    Lab Results: I have reviewed the following results:  Recent Labs     01/26/25 2129 01/28/25  0444 01/29/25  0521   WBC 10.89* 9.66 6.07   HGB 14.5 12.4 12.0   HCT 44.3 37.6 35.6    229 211   BUN  "15 12 10   CREATININE 0.66 0.47* 0.40*   PTT 23  --   --    INR 0.86  --   --      Blood Culture:    Lab Results   Component Value Date    BLOODCX No Growth at 24 hrs. 01/27/2025     Wound Culture: No results found for: \"WOUNDCULT\"  "

## 2025-01-29 NOTE — PLAN OF CARE
Problem: PAIN - ADULT  Goal: Verbalizes/displays adequate comfort level or baseline comfort level  Description: Interventions:  - Encourage patient to monitor pain and request assistance  - Assess pain using appropriate pain scale  - Administer analgesics based on type and severity of pain and evaluate response  - Implement non-pharmacological measures as appropriate and evaluate response  - Consider cultural and social influences on pain and pain management  - Notify physician/advanced practitioner if interventions unsuccessful or patient reports new pain  Outcome: Progressing     Problem: INFECTION - ADULT  Goal: Absence or prevention of progression during hospitalization  Description: INTERVENTIONS:  - Assess and monitor for signs and symptoms of infection  - Monitor lab/diagnostic results  - Monitor all insertion sites, i.e. indwelling lines, tubes, and drains  - Monitor endotracheal if appropriate and nasal secretions for changes in amount and color  - [place] appropriate cooling/warming therapies per order  - Administer medications as ordered  - Instruct and encourage patient and family to use good hand hygiene technique  - Identify and instruct in appropriate isolation precautions for identified infection/condition  Outcome: Progressing  Goal: Absence of fever/infection during neutropenic period  Description: INTERVENTIONS:  - Monitor WBC    Outcome: Progressing     Problem: SAFETY ADULT  Goal: Patient will remain free of falls  Description: INTERVENTIONS:  - Educate patient/family on patient safety including physical limitations  - Instruct patient to call for assistance with activity   - Consult OT/PT to assist with strengthening/mobility   - Keep Call bell within reach  - Keep bed low and locked with side rails adjusted as appropriate  - Keep care items and personal belongings within reach  - Initiate and maintain comfort rounds  - Make Fall Risk Sign visible to staff  - Offer Toileting every  Hours,  in advance of need  - Initiate/Maintain alarm  - Obtain necessary fall risk management equipment:  - Apply yellow socks and bracelet for high fall risk patients  - Consider moving patient to room near nurses station  Outcome: Progressing  Goal: Maintain or return to baseline ADL function  Description: INTERVENTIONS:  -  Assess patient's ability to carry out ADLs; assess patient's baseline for ADL function and identify physical deficits which impact ability to perform ADLs (bathing, care of mouth/teeth, toileting, grooming, dressing, etc.)  - Assess/evaluate cause of self-care deficits   - Assess range of motion  - Assess patient's mobility; develop plan if impaired  - Assess patient's need for assistive devices and provide as appropriate  - Encourage maximum independence but intervene and supervise when necessary  - Involve family in performance of ADLs  - Assess for home care needs following discharge   - Consider OT consult to assist with ADL evaluation and planning for discharge  - Provide patient education as appropriate  Outcome: Progressing  Goal: Maintains/Returns to pre admission functional level  Description: INTERVENTIONS:  - Perform AM-PAC 6 Click Basic Mobility/ Daily Activity assessment daily.  - Set and communicate daily mobility goal to care team and patient/family/caregiver.   - Collaborate with rehabilitation services on mobility goals if consulted  - Perform Range of Motion times a day.  - Reposition patient every  hours.  - Dangle patient  times a day  - Stand patient  times a day  - Ambulate patient  times a day  - Out of bed to chair  times a day   - Out of bed for meals  times a day  - Out of bed for toileting  - Record patient progress and toleration of activity level   Outcome: Progressing     Problem: DISCHARGE PLANNING  Goal: Discharge to home or other facility with appropriate resources  Description: INTERVENTIONS:  - Identify barriers to discharge w/patient and caregiver  - Arrange for  needed discharge resources and transportation as appropriate  - Identify discharge learning needs (meds, wound care, etc.)  - Arrange for interpretive services to assist at discharge as needed  - Refer to Case Management Department for coordinating discharge planning if the patient needs post-hospital services based on physician/advanced practitioner order or complex needs related to functional status, cognitive ability, or social support system  Outcome: Progressing     Problem: Knowledge Deficit  Goal: Patient/family/caregiver demonstrates understanding of disease process, treatment plan, medications, and discharge instructions  Description: Complete learning assessment and assess knowledge base.  Interventions:  - Provide teaching at level of understanding  - Provide teaching via preferred learning methods  Outcome: Progressing

## 2025-01-29 NOTE — PROGRESS NOTES
Progress Note - Hospitalist   Name: Loreto Taylor 59 y.o. 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  Went to urgent care due to right hand swelling, redness, pain and was prescribed keflex for cellulitis. Reports that the redness is spreading up her forearm now.   Fortunately patient remains afebrile without sepsis criteria.   Orthopaedics consulted   1/28 s/p I&D with purulent drainage   Labs/Imaging   Uric acid normal  Blood cultures negative x 2  Formal x-ray unremarkable, arthritis  CT hand with findings of early abscess formation   1/2 Wound cultures growing gram + cocci in pairs, pending final result   IV Ancef on day #3, tailor Abx therapy once wound cultures finalized   Review of media tab with updated photos of hand s/p I&D notes improved erythema, no more purulent drainage, decreased swelling  PRN analgesia   Restless legs syndrome  Continue requip   Giordano syndrome  History noted  Type 2 diabetes mellitus (HCC)  Lab Results   Component Value Date    HGBA1C 8.8 (H) 12/23/2024       Recent Labs     01/28/25  1559 01/28/25  2059 01/29/25  0654 01/29/25  1058   POCGLU 281* 156* 171* 234*       Blood Sugar Average: Last 72 hrs:  (P) 215.1963202774439571  Hold home oral meds   Start SSI with accuchecks   Avoid hypoglycemia     VTE Pharmacologic Prophylaxis: VTE Score: 3 Moderate Risk (Score 3-4) - Pharmacological DVT Prophylaxis Ordered: enoxaparin  (Lovenox).    Mobility:   Basic Mobility Inpatient Raw Score: 24  JH-HLM Goal: 8: Walk 250 feet or more  JH-HLM Achieved: 8: Walk 250 feet ot more  JH-HLM Goal achieved. Continue to encourage appropriate mobility.    Patient Centered Rounds: I performed bedside rounds with nursing staff today.   Discussions with Specialists or Other Care Team Provider: Orthopedics    Education and Discussions with Family / Patient: Patient declined call to .     Current Length of Stay: 0 day(s)  Current Patient Status: Inpatient   Certification Statement: The patient will continue to require additional inpatient hospital stay due to IV antibiotics, finalization of wound cultures  Discharge Plan: Anticipate discharge in 24-48 hrs to home.    Code Status: Level 1 - Full Code    Subjective   Patient doing well on exam, her hand pain is much better than yesterday, she denies any fever, chills, nausea or vomiting    Objective :  Temp:  [98.4 °F (36.9 °C)-98.8 °F (37.1 °C)] 98.4 °F (36.9 °C)  HR:  [] 85  BP: (136-148)/(70-95) 136/81  Resp:  [16-18] 18  SpO2:  [82 %-97 %] 97 %    Body mass index is 32.89 kg/m².     Input and Output Summary (last 24 hours):     Intake/Output Summary (Last 24 hours) at 1/29/2025 1141  Last data filed at 1/29/2025 0701  Gross per 24 hour   Intake 600 ml   Output --   Net 600 ml       Physical Exam  Vitals reviewed.   Constitutional:       Appearance: Normal appearance. She is not ill-appearing.   Cardiovascular:      Rate and Rhythm: Normal rate and regular rhythm.      Heart sounds: Normal heart sounds.   Pulmonary:      Effort: Pulmonary effort is normal.      Breath sounds: Normal breath sounds.   Skin:     General: Skin is warm and dry.      Comments: Right hand wrapped in clean gauze with Ace bandage surrounding.  Fingers and thumb neurovascular intact, ROM improved from days prior, forearm erythema has receded   Neurological:      Mental Status: She is alert and oriented to person,  place, and time.           Lines/Drains:              Lab Results: I have reviewed the following results:   Results from last 7 days   Lab Units 01/29/25  0521 01/28/25 0444   WBC Thousand/uL 6.07 9.66   HEMOGLOBIN g/dL 12.0 12.4   HEMATOCRIT % 35.6 37.6   PLATELETS Thousands/uL 211 229   SEGS PCT %  --  72   LYMPHO PCT %  --  20   MONO PCT %  --  7   EOS PCT %  --  0     Results from last 7 days   Lab Units 01/29/25  0521 01/28/25 0444 01/26/25 2129   SODIUM mmol/L 136   < > 138   POTASSIUM mmol/L 3.9   < > 3.8   CHLORIDE mmol/L 103   < > 102   CO2 mmol/L 26   < > 28   BUN mg/dL 10   < > 15   CREATININE mg/dL 0.40*   < > 0.66   ANION GAP mmol/L 7   < > 8   CALCIUM mg/dL 8.7   < > 9.2   ALBUMIN g/dL  --   --  4.0   TOTAL BILIRUBIN mg/dL  --   --  1.01*   ALK PHOS U/L  --   --  85   ALT U/L  --   --  20   AST U/L  --   --  20   GLUCOSE RANDOM mg/dL 194*   < > 211*    < > = values in this interval not displayed.     Results from last 7 days   Lab Units 01/26/25 2129   INR  0.86     Results from last 7 days   Lab Units 01/29/25  1058 01/29/25  0654 01/28/25 2059 01/28/25  1559 01/28/25  1103 01/28/25  1059 01/28/25  1051 01/28/25  0642 01/27/25  2050 01/27/25  1710 01/27/25  1335 01/27/25  0915   POC GLUCOSE mg/dl 234* 171* 156* 281* 298* 283* 324* 136 206* 201* 195* 97         Results from last 7 days   Lab Units 01/26/25 2129   LACTIC ACID mmol/L 1.1   PROCALCITONIN ng/ml <0.05       Recent Cultures (last 7 days):   Results from last 7 days   Lab Units 01/28/25  1532 01/27/25  0332 01/26/25 2135   BLOOD CULTURE   --  No Growth at 48 hrs. No Growth at 48 hrs.   GRAM STAIN RESULT  3+ Polys*  3+ Gram positive cocci in pairs*  No Polys or Bacteria seen  --   --        Imaging Results Review: I reviewed radiology reports from this admission including: CT right hand.      Last 24 Hours Medication List:     Current Facility-Administered Medications:     acetaminophen (TYLENOL) tablet 650 mg, Q6H PRN    ceFAZolin  (ANCEF) IVPB (premix in dextrose) 1,000 mg 50 mL, Q8H, Last Rate: 1,000 mg (01/29/25 0637)    cyclobenzaprine (FLEXERIL) tablet 10 mg, TID PRN    enoxaparin (LOVENOX) subcutaneous injection 40 mg, Daily    gabapentin (NEURONTIN) capsule 600 mg, TID    HYDROmorphone (DILAUDID) injection 0.5 mg, Q4H PRN    insulin lispro (HumALOG/ADMELOG) 100 units/mL subcutaneous injection 1-5 Units, TID AC **AND** Fingerstick Glucose (POCT), TID AC    insulin lispro (HumALOG/ADMELOG) 100 units/mL subcutaneous injection 1-5 Units, HS    loratadine (CLARITIN) tablet 10 mg, Daily    oxyCODONE (ROXICODONE) IR tablet 5 mg, Q6H PRN    pantoprazole (PROTONIX) EC tablet 40 mg, Daily Before Breakfast    rOPINIRole (REQUIP) tablet 1 mg, HS    Administrative Statements   Today, Patient Was Seen By: Anna Reyna PA-C      **Please Note: This note may have been constructed using a voice recognition system.**

## 2025-01-29 NOTE — ASSESSMENT & PLAN NOTE
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  Went to urgent care due to right hand swelling, redness, pain and was prescribed keflex for cellulitis. Reports that the redness is spreading up her forearm now.   Fortunately patient remains afebrile without sepsis criteria.   Orthopaedics consulted   1/28 s/p I&D with purulent drainage   Labs/Imaging   Uric acid normal  Blood cultures negative x 2  Formal x-ray unremarkable, arthritis  CT hand with findings of early abscess formation   1/2 Wound cultures growing gram + cocci in pairs, pending final result   IV Ancef on day #3, tailor Abx therapy once wound cultures finalized   Review of media tab with updated photos of hand s/p I&D notes improved erythema, no more purulent drainage, decreased swelling  PRN analgesia

## 2025-01-29 NOTE — PLAN OF CARE
Problem: PAIN - ADULT  Goal: Verbalizes/displays adequate comfort level or baseline comfort level  Description: Interventions:  - Encourage patient to monitor pain and request assistance  - Assess pain using appropriate pain scale  - Administer analgesics based on type and severity of pain and evaluate response  - Implement non-pharmacological measures as appropriate and evaluate response  - Consider cultural and social influences on pain and pain management  - Notify physician/advanced practitioner if interventions unsuccessful or patient reports new pain  Outcome: Progressing     Problem: INFECTION - ADULT  Goal: Absence or prevention of progression during hospitalization  Description: INTERVENTIONS:  - Assess and monitor for signs and symptoms of infection  - Monitor lab/diagnostic results  - Monitor all insertion sites, i.e. indwelling lines, tubes, and drains  - Monitor endotracheal if appropriate and nasal secretions for changes in amount and color  - [place] appropriate cooling/warming therapies per order  - Administer medications as ordered  - Instruct and encourage patient and family to use good hand hygiene technique  - Identify and instruct in appropriate isolation precautions for identified infection/condition  Outcome: Progressing  Goal: Absence of fever/infection during neutropenic period  Description: INTERVENTIONS:  - Monitor WBC    Outcome: Progressing     Problem: DISCHARGE PLANNING  Goal: Discharge to home or other facility with appropriate resources  Description: INTERVENTIONS:  - Identify barriers to discharge w/patient and caregiver  - Arrange for needed discharge resources and transportation as appropriate  - Identify discharge learning needs (meds, wound care, etc.)  - Arrange for interpretive services to assist at discharge as needed  - Refer to Case Management Department for coordinating discharge planning if the patient needs post-hospital services based on physician/advanced  practitioner order or complex needs related to functional status, cognitive ability, or social support system  Outcome: Progressing     Problem: Knowledge Deficit  Goal: Patient/family/caregiver demonstrates understanding of disease process, treatment plan, medications, and discharge instructions  Description: Complete learning assessment and assess knowledge base.  Interventions:  - Provide teaching at level of understanding  - Provide teaching via preferred learning methods  Outcome: Progressing

## 2025-01-29 NOTE — ASSESSMENT & PLAN NOTE
Lab Results   Component Value Date    HGBA1C 8.8 (H) 12/23/2024       Recent Labs     01/28/25  1559 01/28/25 2059 01/29/25  0654 01/29/25  1058   POCGLU 281* 156* 171* 234*       Blood Sugar Average: Last 72 hrs:  (P) 215.9225014247217115  Hold home oral meds   Start SSI with accuchecks   Avoid hypoglycemia

## 2025-01-30 VITALS
BODY MASS INDEX: 32.79 KG/M2 | HEIGHT: 57 IN | RESPIRATION RATE: 18 BRPM | OXYGEN SATURATION: 97 % | DIASTOLIC BLOOD PRESSURE: 93 MMHG | HEART RATE: 85 BPM | SYSTOLIC BLOOD PRESSURE: 151 MMHG | WEIGHT: 152 LBS | TEMPERATURE: 97.6 F

## 2025-01-30 LAB
BACTERIA WND AEROBE CULT: ABNORMAL
BACTERIA WND AEROBE CULT: ABNORMAL
ERYTHROCYTE [DISTWIDTH] IN BLOOD BY AUTOMATED COUNT: 12.7 % (ref 11.6–15.1)
GLUCOSE SERPL-MCNC: 150 MG/DL (ref 65–140)
GLUCOSE SERPL-MCNC: 243 MG/DL (ref 65–140)
GRAM STN SPEC: ABNORMAL
HCT VFR BLD AUTO: 35.9 % (ref 34.8–46.1)
HGB BLD-MCNC: 11.8 G/DL (ref 11.5–15.4)
MCH RBC QN AUTO: 30.4 PG (ref 26.8–34.3)
MCHC RBC AUTO-ENTMCNC: 32.9 G/DL (ref 31.4–37.4)
MCV RBC AUTO: 93 FL (ref 82–98)
PLATELET # BLD AUTO: 224 THOUSANDS/UL (ref 149–390)
PMV BLD AUTO: 9.5 FL (ref 8.9–12.7)
RBC # BLD AUTO: 3.88 MILLION/UL (ref 3.81–5.12)
WBC # BLD AUTO: 4.57 THOUSAND/UL (ref 4.31–10.16)

## 2025-01-30 PROCEDURE — 99239 HOSP IP/OBS DSCHRG MGMT >30: CPT

## 2025-01-30 PROCEDURE — 99024 POSTOP FOLLOW-UP VISIT: CPT | Performed by: PHYSICIAN ASSISTANT

## 2025-01-30 PROCEDURE — 85027 COMPLETE CBC AUTOMATED: CPT

## 2025-01-30 PROCEDURE — 82948 REAGENT STRIP/BLOOD GLUCOSE: CPT

## 2025-01-30 RX ORDER — CEPHALEXIN 500 MG/1
500 CAPSULE ORAL EVERY 6 HOURS SCHEDULED
Qty: 16 CAPSULE | Refills: 0 | Status: CANCELLED | OUTPATIENT
Start: 2025-01-30 | End: 2025-02-03

## 2025-01-30 RX ORDER — OXYCODONE HYDROCHLORIDE 5 MG/1
5 TABLET ORAL EVERY 6 HOURS PRN
Qty: 8 TABLET | Refills: 0 | Status: SHIPPED | OUTPATIENT
Start: 2025-01-30 | End: 2025-02-01

## 2025-01-30 RX ADMIN — HYDROMORPHONE HYDROCHLORIDE 0.5 MG: 1 INJECTION, SOLUTION INTRAMUSCULAR; INTRAVENOUS; SUBCUTANEOUS at 08:41

## 2025-01-30 RX ADMIN — ENOXAPARIN SODIUM 40 MG: 40 INJECTION SUBCUTANEOUS at 08:41

## 2025-01-30 RX ADMIN — GABAPENTIN 600 MG: 300 CAPSULE ORAL at 08:42

## 2025-01-30 RX ADMIN — INSULIN LISPRO 1 UNITS: 100 INJECTION, SOLUTION INTRAVENOUS; SUBCUTANEOUS at 07:53

## 2025-01-30 RX ADMIN — CEFAZOLIN SODIUM 1000 MG: 1 SOLUTION INTRAVENOUS at 14:03

## 2025-01-30 RX ADMIN — INSULIN LISPRO 2 UNITS: 100 INJECTION, SOLUTION INTRAVENOUS; SUBCUTANEOUS at 12:03

## 2025-01-30 RX ADMIN — CEFAZOLIN SODIUM 1000 MG: 1 SOLUTION INTRAVENOUS at 05:45

## 2025-01-30 RX ADMIN — PANTOPRAZOLE SODIUM 40 MG: 20 TABLET, DELAYED RELEASE ORAL at 05:45

## 2025-01-30 NOTE — ASSESSMENT & PLAN NOTE
Lab Results   Component Value Date    HGBA1C 8.8 (H) 12/23/2024       Recent Labs     01/29/25  1058 01/29/25  1616 01/29/25  2114 01/30/25  0650   POCGLU 234* 273* 281* 150*       Blood Sugar Average: Last 72 hrs:  (P) 219.8090566823821501  Resume oral antihyperglycemic's on discharge

## 2025-01-30 NOTE — DISCHARGE SUMMARY
Discharge Summary - Hospitalist   Name: Loreto Taylor 59 y.o. female I MRN: [id number]  Unit/Bed#: W -01 I Date of Admission: 1/27/2025   Date of Service: 1/30/2025 I Hospital Day: 1     Assessment & Plan  Cellulitis of right hand  History of arthritis of CMC joint of right thumb, De Quervain's disease, carpal tunnel syndrome, right thumb trigger finger  Follows with [facility] ortho -- s/p recent multiple steroid injections to the hands on 01/09/25 including of right thumb DQ, bilateral CMC CSI, bilateral MCPJ CSI.  Went to urgent care due to right hand swelling, redness, pain and was prescribed keflex for cellulitis.   Uric acid normal  Blood cultures negative x 2 at 72 hours  Orthopaedics consulted   CT hand with findings of early abscess formation   1/28 s/p I&D with purulent drainage  2/2 wound cultures growing Staphylococcus aureus --> started on IV Ancef, continue Keflex to complete 7-day course  Continue warm soaks and other discharge instructions as provided  PRN analgesia   Restless legs syndrome  Continue requip   Giordano syndrome  History noted  Type 2 diabetes mellitus (HCC)  Lab Results   Component Value Date    HGBA1C 8.8 (H) 12/23/2024       Recent Labs     01/29/25  1058 01/29/25  1616 01/29/25  2114 01/30/25  0650   POCGLU 234* 273* 281* 150*       Blood Sugar Average: Last 72 hrs:  (P) 219.9653948409009714  Resume oral antihyperglycemic's on discharge       Medical Problems       Resolved Problems  Date Reviewed: 1/27/2025   None       Discharging Physician / Practitioner: Anna Reyna PA-C  PCP: Jan Waite DO  Admission Date:   Admission Orders (From admission, onward)       Ordered        01/29/25 0818  INPATIENT ADMISSION  Once            01/27/25 0401  Place in Observation  Once                          Discharge Date: 01/30/25    Consultations During Hospital Stay:  Orthopedics    Procedures Performed:   Incision and drainage    Significant Findings / Test Results:   Wound  culture and Gram stain x 2 growing Staphylococcus aureus  Blood cultures x 2 no growth at 72 hours  CT upper extremity of the right hand with contrast  Partially organized subcutaneous fluid collection along the radial aspect of the first metacarpal bone, suspicious for developing abscess.   X-ray right hand  No acute displaced fracture or dislocation   Arthritic changes at the scaphoid trapezial and scaphoid trapezial joint. First carpometacarpal arthritis   Uric acid 3.0    Incidental Findings:   None    Test Results Pending at Discharge (will require follow up):   None     Outpatient Tests Requested:  None    Complications: None    Reason for Admission: Right hand cellulitis with abscess    Hospital Course:   Loreto Taylor is a 59 y.o. female patient who originally presented to the hospital on 1/27/2025 due to worsening right hand swelling redness and pain after being prescribed Keflex by urgent care.  Patient was started on IV, x-ray was obtained which was fairly normal, notable for arthritis.  Uric acid was normal ruling out gout.  After 24 hours on IV antibiotics the patient's hand continued to have increased erythema and swelling and pain.  CT of the head was obtained showing early forming abscess.  Orthopedics was consulted and performed an I&D with wound cultures x 2 taken.  These cultures resulted in Staphylococcus aureus that was sensitive to the IV Ancef.  Patient improved after the procedure and was continued on IV antibiotics.  She remained hemodynamically stable and without leukocytosis or other signs of infection throughout her stay.  Blood cultures x 2 were negative for growth at 72 hours.  Given stable labs, vital signs and overall clinical improvement the patient is medically stable for discharge on oral antibiotic course at this time.  She will follow-up with orthopedics as scheduled at discharge.  Return precautions and instructions were discussed with the patient in detail.      Please see  "above list of diagnoses and related plan for additional information.     Condition at Discharge: good    Discharge Day Visit / Exam:   Subjective: Patient doing well on exam, notes her hand pain is much improved than it was.  Denies any fever, chills, worsening pain or redness.  Vitals: Blood Pressure: 151/93 (01/30/25 0651)  Pulse: 85 (01/30/25 0651)  Temperature: 97.6 °F (36.4 °C) (01/30/25 0651)  Temp Source: Oral (01/27/25 2226)  Respirations: 18 (01/30/25 0651)  Height: 4' 9\" (144.8 cm) (01/27/25 0640)  Weight - Scale: 68.9 kg (152 lb) (01/27/25 0640)  SpO2: 97 % (01/30/25 0651)  Physical Exam  Vitals reviewed.   Constitutional:       General: She is not in acute distress.     Appearance: Normal appearance. She is not ill-appearing.   Cardiovascular:      Rate and Rhythm: Normal rate and regular rhythm.      Heart sounds: Normal heart sounds.   Pulmonary:      Effort: Pulmonary effort is normal.      Breath sounds: Normal breath sounds.   Skin:     General: Skin is warm and dry.      Comments: Right hand wrapped in clean gauze and Ace bandage, swelling has significantly decreased from days prior.  Erythema has receded as well.  Hand is less tender to palpation and range of motion is improved in both the fingers and the thumb   Neurological:      Mental Status: She is alert.          Discussion with Family: Patient declined call to .     Discharge instructions/Information to patient and family:   See after visit summary for information provided to patient and family.      Provisions for Follow-Up Care:  See after visit summary for information related to follow-up care and any pertinent home health orders.      Mobility at time of Discharge:   Basic Mobility Inpatient Raw Score: 24  JH-HLM Goal: 8: Walk 250 feet or more  JH-HLM Achieved: 8: Walk 250 feet ot more  HLM Goal achieved. Continue to encourage appropriate mobility.     Disposition:   Home    Planned Readmission: No    Discharge " Medications:  See after visit summary for reconciled discharge medications provided to patient and/or family.      Administrative Statements   Discharge Statement:  I have spent a total time of 55 minutes in caring for this patient on the day of the visit/encounter. >30 minutes of time was spent on: Diagnostic results, Instructions for management, Patient and family education, Counseling / Coordination of care, Documenting in the medical record, Reviewing / ordering tests, medicine, procedures  , and Communicating with other healthcare professionals .    **Please Note: This note may have been constructed using a voice recognition system**

## 2025-01-30 NOTE — ASSESSMENT & PLAN NOTE
History of arthritis of CMC joint of right thumb, De Quervain's disease, carpal tunnel syndrome, right thumb trigger finger  Follows with [facility] ortho -- s/p recent multiple steroid injections to the hands on 01/09/25 including of right thumb DQ, bilateral CMC CSI, bilateral MCPJ CSI.  Went to urgent care due to right hand swelling, redness, pain and was prescribed keflex for cellulitis.   Uric acid normal  Blood cultures negative x 2 at 72 hours  Orthopaedics consulted   CT hand with findings of early abscess formation   1/28 s/p I&D with purulent drainage  2/2 wound cultures growing Staphylococcus aureus --> started on IV Ancef, continue Keflex to complete 7-day course  Continue warm soaks and other discharge instructions as provided  PRN analgesia

## 2025-01-30 NOTE — PLAN OF CARE
Problem: PAIN - ADULT  Goal: Verbalizes/displays adequate comfort level or baseline comfort level  Description: Interventions:  - Encourage patient to monitor pain and request assistance  - Assess pain using appropriate pain scale  - Administer analgesics based on type and severity of pain and evaluate response  - Implement non-pharmacological measures as appropriate and evaluate response  - Consider cultural and social influences on pain and pain management  - Notify physician/advanced practitioner if interventions unsuccessful or patient reports new pain  Outcome: Progressing     Problem: INFECTION - ADULT  Goal: Absence or prevention of progression during hospitalization  Description: INTERVENTIONS:  - Assess and monitor for signs and symptoms of infection  - Monitor lab/diagnostic results  - Monitor all insertion sites, i.e. indwelling lines, tubes, and drains  - Monitor endotracheal if appropriate and nasal secretions for changes in amount and color  - [place] appropriate cooling/warming therapies per order  - Administer medications as ordered  - Instruct and encourage patient and family to use good hand hygiene technique  - Identify and instruct in appropriate isolation precautions for identified infection/condition  Outcome: Progressing  Goal: Absence of fever/infection during neutropenic period  Description: INTERVENTIONS:  - Monitor WBC    Outcome: Progressing     Problem: SAFETY ADULT  Goal: Patient will remain free of falls  Description: INTERVENTIONS:  - Educate patient/family on patient safety including physical limitations  - Instruct patient to call for assistance with activity   - Consult OT/PT to assist with strengthening/mobility   - Keep Call bell within reach  - Keep bed low and locked with side rails adjusted as appropriate  - Keep care items and personal belongings within reach  - Initiate and maintain comfort rounds  - Make Fall Risk Sign visible to staff  - Offer Toileting every  Hours,  in advance of need  - Initiate/Maintain alarm  - Obtain necessary fall risk management equipment:  - Apply yellow socks and bracelet for high fall risk patients  - Consider moving patient to room near nurses station  Outcome: Progressing  Goal: Maintain or return to baseline ADL function  Description: INTERVENTIONS:  -  Assess patient's ability to carry out ADLs; assess patient's baseline for ADL function and identify physical deficits which impact ability to perform ADLs (bathing, care of mouth/teeth, toileting, grooming, dressing, etc.)  - Assess/evaluate cause of self-care deficits   - Assess range of motion  - Assess patient's mobility; develop plan if impaired  - Assess patient's need for assistive devices and provide as appropriate  - Encourage maximum independence but intervene and supervise when necessary  - Involve family in performance of ADLs  - Assess for home care needs following discharge   - Consider OT consult to assist with ADL evaluation and planning for discharge  - Provide patient education as appropriate  Outcome: Progressing  Goal: Maintains/Returns to pre admission functional level  Description: INTERVENTIONS:  - Perform AM-PAC 6 Click Basic Mobility/ Daily Activity assessment daily.  - Set and communicate daily mobility goal to care team and patient/family/caregiver.   - Collaborate with rehabilitation services on mobility goals if consulted  - Perform Range of Motion times a day.  - Reposition patient every  hours.  - Dangle patient  times a day  - Stand patient  times a day  - Ambulate patient  times a day  - Out of bed to chair  times a day   - Out of bed for meals  times a day  - Out of bed for toileting  - Record patient progress and toleration of activity level   Outcome: Progressing     Problem: DISCHARGE PLANNING  Goal: Discharge to home or other facility with appropriate resources  Description: INTERVENTIONS:  - Identify barriers to discharge w/patient and caregiver  - Arrange for  needed discharge resources and transportation as appropriate  - Identify discharge learning needs (meds, wound care, etc.)  - Arrange for interpretive services to assist at discharge as needed  - Refer to Case Management Department for coordinating discharge planning if the patient needs post-hospital services based on physician/advanced practitioner order or complex needs related to functional status, cognitive ability, or social support system  Outcome: Progressing     Problem: Knowledge Deficit  Goal: Patient/family/caregiver demonstrates understanding of disease process, treatment plan, medications, and discharge instructions  Description: Complete learning assessment and assess knowledge base.  Interventions:  - Provide teaching at level of understanding  - Provide teaching via preferred learning methods  Outcome: Progressing

## 2025-01-30 NOTE — PLAN OF CARE
Problem: PAIN - ADULT  Goal: Verbalizes/displays adequate comfort level or baseline comfort level  Description: Interventions:  - Encourage patient to monitor pain and request assistance  - Assess pain using appropriate pain scale  - Administer analgesics based on type and severity of pain and evaluate response  - Implement non-pharmacological measures as appropriate and evaluate response  - Consider cultural and social influences on pain and pain management  - Notify physician/advanced practitioner if interventions unsuccessful or patient reports new pain  Outcome: Progressing     Problem: INFECTION - ADULT  Goal: Absence or prevention of progression during hospitalization  Description: INTERVENTIONS:  - Assess and monitor for signs and symptoms of infection  - Monitor lab/diagnostic results  - Monitor all insertion sites, i.e. indwelling lines, tubes, and drains  - Monitor endotracheal if appropriate and nasal secretions for changes in amount and color  - [place] appropriate cooling/warming therapies per order  - Administer medications as ordered  - Instruct and encourage patient and family to use good hand hygiene technique  - Identify and instruct in appropriate isolation precautions for identified infection/condition  Outcome: Progressing  Goal: Absence of fever/infection during neutropenic period  Description: INTERVENTIONS:  - Monitor WBC    Outcome: Progressing     Problem: SAFETY ADULT  Goal: Patient will remain free of falls  Description: INTERVENTIONS:  - Educate patient/family on patient safety including physical limitations  - Instruct patient to call for assistance with activity   - Consult OT/PT to assist with strengthening/mobility   - Keep Call bell within reach  - Keep bed low and locked with side rails adjusted as appropriate  - Keep care items and personal belongings within reach  - Initiate and maintain comfort rounds  - Make Fall Risk Sign visible to staff  - Offer Toileting every  Hours,  in advance of need  - Initiate/Maintain alarm  - Obtain necessary fall risk management equipment:  - Apply yellow socks and bracelet for high fall risk patients  - Consider moving patient to room near nurses station  Outcome: Progressing  Goal: Maintain or return to baseline ADL function  Description: INTERVENTIONS:  -  Assess patient's ability to carry out ADLs; assess patient's baseline for ADL function and identify physical deficits which impact ability to perform ADLs (bathing, care of mouth/teeth, toileting, grooming, dressing, etc.)  - Assess/evaluate cause of self-care deficits   - Assess range of motion  - Assess patient's mobility; develop plan if impaired  - Assess patient's need for assistive devices and provide as appropriate  - Encourage maximum independence but intervene and supervise when necessary  - Involve family in performance of ADLs  - Assess for home care needs following discharge   - Consider OT consult to assist with ADL evaluation and planning for discharge  - Provide patient education as appropriate  Outcome: Progressing  Goal: Maintains/Returns to pre admission functional level  Description: INTERVENTIONS:  - Perform AM-PAC 6 Click Basic Mobility/ Daily Activity assessment daily.  - Set and communicate daily mobility goal to care team and patient/family/caregiver.   - Collaborate with rehabilitation services on mobility goals if consulted  - Perform Range of Motion times a day.  - Reposition patient every  hours.  - Dangle patient  times a day  - Stand patient  times a day  - Ambulate patient  times a day  - Out of bed to chair  times a day   - Out of bed for meals  times a day  - Out of bed for toileting  - Record patient progress and toleration of activity level   Outcome: Progressing     Problem: DISCHARGE PLANNING  Goal: Discharge to home or other facility with appropriate resources  Description: INTERVENTIONS:  - Identify barriers to discharge w/patient and caregiver  - Arrange for  needed discharge resources and transportation as appropriate  - Identify discharge learning needs (meds, wound care, etc.)  - Arrange for interpretive services to assist at discharge as needed  - Refer to Case Management Department for coordinating discharge planning if the patient needs post-hospital services based on physician/advanced practitioner order or complex needs related to functional status, cognitive ability, or social support system  Outcome: Progressing     Problem: Knowledge Deficit  Goal: Patient/family/caregiver demonstrates understanding of disease process, treatment plan, medications, and discharge instructions  Description: Complete learning assessment and assess knowledge base.  Interventions:  - Provide teaching at level of understanding  - Provide teaching via preferred learning methods  Outcome: Progressing

## 2025-01-30 NOTE — DISCHARGE INSTR - AVS FIRST PAGE
Dear Loreto Taylor,     It was our pleasure to care for you here at [facility].  It is our hope that we were always able to exceed the expected standards for your care during your stay.  You were hospitalized due to cellulitis with abscess of the right hand.  You were cared for on the fourth MedSur floor by Anna Reyna PA-C under the service of Catalino Berger DO with the [facility] who covers for your primary care physician (PCP), Jan Waite DO, while you were hospitalized.  If you have any questions or concerns related to this hospitalization, you may contact us at .  For follow up as well as any medication refills, we recommend that you follow up with your primary care physician.  A registered nurse will reach out to you by phone within a few days after your discharge to answer any additional questions that you may have after going home.  However, at this time we provide for you here, the most important instructions / recommendations at discharge:     Notable Medication Adjustments -   Please start taking cephalexin (Keflex) 500 mg capsule every 6 hours for the next 4 days (finish on 2/3/25)  Continue taking oxycodone 5 mg every 6 hours as needed for severe pain over the next 2 days  You can take Tylenol or ibuprofen interchangeably for mild/moderate hand pain as well  Follow up with orthopaedics in 1 week  Discharge Instructions - Orthopedics  Weight Bearing Status:                                           As tolerated to the right upper extremity.   Pain:  Continue analgesics as directed  Dressing Instructions:   Please continue soapy soaks for 15 minutes twice a day with dry dressings in between. Continue these until re-evaluation in office.   Appt Instructions:   If you do not have your appointment, please call the clinic at [phone] to schedule with Dr. Morfin.   Otherwise follow up as scheduled.  Contact the  office sooner if you experience any increased numbness/tingling in the extremities.    Other Instructions -   Please return to the emergency department if you develop new or worsening pain in your hand, swelling, increased drainage or change in color to the incision site, persistent fever or chills  Please review this entire after visit summary as additional general instructions including medication list, appointments, activity, diet, any pertinent wound care, and other additional recommendations from your care team that may be provided for you.      Sincerely,     Anna Reyna PA-C

## 2025-01-30 NOTE — ASSESSMENT & PLAN NOTE
Patient with superficial abscess over first MCP, s/p I&D at bedside 1/28/2025   Wound re-evaluated this morning. Marked improvement in erythema.   Improvement in pain.   Cultures with staph A.   Antibiotics per primary team.   Will continue BID soapy soaks x 15 minutes, rinse, cover with gauze/ananda. Discussed with nursing at bedside.   Pain control per primary team.   DVT ppx per primary team.   Medical management per primary team.   Patient should follow up with Dr. Morfin in 1 week upon discharge.

## 2025-01-30 NOTE — PROGRESS NOTES
Progress Note - Orthopedics   Name: Loreto Taylor 59 y.o. female I MRN: [id number]  Unit/Bed#: W -01 I Date of Admission: 1/27/2025   Date of Service: 1/30/2025 I Hospital Day: 1    Assessment & Plan  Cellulitis of right hand  Patient with superficial abscess over first MCP, s/p I&D at bedside 1/28/2025   Wound re-evaluated this morning. Marked improvement in erythema.   Improvement in pain.   Cultures with staph A.   Antibiotics per primary team.   Will continue BID soapy soaks x 15 minutes, rinse, cover with gauze/ananda. Discussed with nursing at bedside.   Pain control per primary team.   DVT ppx per primary team.   Medical management per primary team.   Patient should follow up with Dr. Morfin in 1 week upon discharge.     Ok for discharge from Orthopedics service perspective.  Orthopedics service will follow.  Please contact the SecureChat role for the Orthopedics service with any questions/concerns.    Subjective   59 y.o.female seen and examined, pain has improved from admission. No significant complaints today. Has been doing soapy soaks with nursing.     Objective :  Temp:  [97.6 °F (36.4 °C)-98.3 °F (36.8 °C)] 97.6 °F (36.4 °C)  HR:  [] 85  BP: (149-157)/(84-95) 151/93  Resp:  [16-18] 18  SpO2:  [90 %-97 %] 97 %    Physical Exam  Musculoskeletal: rightupper  Please see wound images for full characterization of wound. Patient with ttp over incision site. No drainage appreciated    Full range of motion of index, long, ring and small fingers.   Improved range of motion to thumb.   Marked improvement of erythema.   Sensation intact to median/radial/ulnar nerve distribution   Motor intact anterior interosseous nerve/posterior interosseous nerve/median/radial/ulnar nerve distributions  2+ radial pulse                    Lab Results: I have reviewed the following results:  Recent Labs     01/28/25  0444 01/29/25  0521 01/30/25  0453   WBC 9.66 6.07 4.57   HGB 12.4 12.0 11.8   HCT 37.6 35.6 35.9   PLT  229 211 224   BUN 12 10  --    CREATININE 0.47* 0.40*  --      Blood Culture:    Lab Results   Component Value Date    BLOODCX No Growth at 48 hrs. 01/27/2025     Wound Culture:   Lab Results   Component Value Date    WOUNDCULT 1+ Growth of Staphylococcus aureus (A) 01/28/2025    WOUNDCULT 1+ Growth of Staphylococcus aureus (A) 01/28/2025

## 2025-01-31 NOTE — UTILIZATION REVIEW
NOTIFICATION OF ADMISSION DISCHARGE   This is a Notification of Discharge from [facility]. Please be advised that this patient has been discharge from our facility. Below you will find the admission and discharge date and time including the patient’s disposition.   UTILIZATION REVIEW CONTACT:  Christiana Jesus  Utilization   Network Utilization Review Department  Phone: [phone] x carefully listen to the prompts. All voicemails are confidential.  Email: [email or web address]     ADMISSION INFORMATION  PRESENTATION DATE: 1/27/2025  2:12 AM  OBERVATION ADMISSION DATE: 01/27/2025 0401  INPATIENT ADMISSION DATE: 1/29/25  8:18 AM   DISCHARGE DATE: 1/30/2025  3:52 PM   DISPOSITION:Home/Self Care    Network Utilization Review Department  ATTENTION: Please call with any questions or concerns to [phone] and carefully listen to the prompts so that you are directed to the right person. All voicemails are confidential.   For Discharge needs, contact Care Management DC Support Team at [phone] opt. 2  Send all requests for admission clinical reviews, approved or denied determinations and any other requests to dedicated fax number below belonging to the campus where the patient is receiving treatment. List of dedicated fax numbers for the Facilities:  FACILITY NAME UR FAX NUMBER   ADMISSION DENIALS (Administrative/Medical Necessity) [phone]   DISCHARGE SUPPORT TEAM ([facility]) [phone]   PARENT CHILD HEALTH (Maternity/NICU/Pediatrics) [phone]   [facility] [phone]   [facility] [phone]   [facility] [phone]   [facility] [phone]   [facility] [phone]   General acute hospital [phone]   [facility] [phone]   [facility]  [phone]   [facility] [phone]   [facility] [phone]   [facility] [phone]   [facility] [phone]

## 2025-02-01 LAB
BACTERIA BLD CULT: NORMAL
BACTERIA BLD CULT: NORMAL

## 2025-02-11 ENCOUNTER — OFFICE VISIT (OUTPATIENT)
Dept: OBGYN CLINIC | Facility: CLINIC | Age: 60
End: 2025-02-11
Payer: COMMERCIAL

## 2025-02-11 VITALS — HEIGHT: 58 IN | WEIGHT: 153 LBS | BODY MASS INDEX: 32.12 KG/M2

## 2025-02-11 DIAGNOSIS — L03.113 CELLULITIS OF RIGHT HAND: Primary | ICD-10-CM

## 2025-02-11 PROCEDURE — 99213 OFFICE O/P EST LOW 20 MIN: CPT | Performed by: STUDENT IN AN ORGANIZED HEALTH CARE EDUCATION/TRAINING PROGRAM

## 2025-02-11 NOTE — PROGRESS NOTES
ORTHOPAEDIC HAND, WRIST, AND ELBOW OFFICE  VISIT      ASSESSMENT/PLAN:      Diagnoses and all orders for this visit:    Cellulitis of right hand          59 y.o. female with Right hand cellulitis, S/p bedside I&D 1/28/25  Anatomy of the condition/s as well as treatment options and expected outcomes were discussed.  Any pertinent imaging or lab results were reviewed with the patient.   The patient verbalized understanding of exam findings and treatment plan.   The patient was given the opportunity to ask questions.  Questions were answered to the patient's satisfaction.  The patient decided to move forward with continue with appropriate wound care. Steri strips may be applied over incision. She will be able apply a new Steri-Strips as a follow-up.  Patient may follow-up in a couple weeks for reevaluation.  Patient may cancel appointment she overall is feeling better.      Follow Up:  4 weeks       To Do Next Visit:  Re-evaluation of current issue        Aaron Myers MD  Attending, Orthopaedic Surgery  Hand, Wrist, and Elbow Surgery  [facility]    ______________________________________________________________________________________________    CHIEF COMPLAINT:  Chief Complaint   Patient presents with    Right Hand - Pain     Patient was in the hospital for 5 days with cellulitis.  Was n IV antibiotics when in the hospital, 4 days PO when she was home. Patients finger is still draining not hot red or swelling        SUBJECTIVE:  Patient is a 59 y.o.  female who presents today for evaluation and treatment of cellulitis of right hand.  Patient states she sees Dr. Barry for her right hand and states she believes she had a steroid injection 2 around 1/13/2025.  Patient states 2 weeks later she noticed pain and redness around her injection site.  Patient was seen by the emergency department where she had bedside I&D performed on 1/28/2025.  Patient states she was given prescription for  antibiotics and states she has finished her medication.  Patient states she overall is feeling better and only reports occasional discharge over her incision.    I have personally reviewed all the relevant PMH, PSH, SH, FH, Medications and allergies      PAST MEDICAL HISTORY:  Past Medical History:   Diagnosis Date    Diabetes mellitus (HCC)     Gastroparesis     Giordano's syndrome        PAST SURGICAL HISTORY:  History reviewed. No pertinent surgical history.    FAMILY HISTORY:  History reviewed. No pertinent family history.    SOCIAL HISTORY:  Social History     Tobacco Use    Smoking status: Never   Vaping Use    Vaping status: Never Used   Substance Use Topics    Alcohol use: Never    Drug use: Never       MEDICATIONS:    Current Outpatient Medications:     Empagliflozin (JARDIANCE) 10 MG TABS tablet, Take 10 mg by mouth daily, Disp: , Rfl:     esomeprazole (NexIUM) 40 MG capsule, , Disp: , Rfl:     gabapentin (Neurontin) 300 mg capsule, Take by mouth, Disp: , Rfl:     glimepiride (AMARYL) 4 mg tablet, Take 2 tablets by mouth every morning, Disp: , Rfl:     ibuprofen (MOTRIN) 200 mg tablet, Take 200 mg by mouth every 6 (six) hours as needed, Disp: , Rfl:     loratadine (CLARITIN) 10 mg tablet, Take 10 mg by mouth, Disp: , Rfl:     LORazepam (Ativan) 1 mg tablet, Take by mouth, Disp: , Rfl:     meloxicam (MOBIC) 7.5 mg tablet, Take 7.5 mg by mouth daily, Disp: , Rfl:     metFORMIN (GLUCOPHAGE) 500 mg tablet, Take 1 tablet by mouth 2 (two) times a day with meals, Disp: , Rfl:     pioglitazone (ACTOS) 45 mg tablet, Take 45 mg by mouth daily, Disp: , Rfl:     rOPINIRole (REQUIP) 1 mg tablet, Take 1 mg by mouth, Disp: , Rfl:     tobramycin-dexamethasone (TOBRADEX) ophthalmic suspension, , Disp: , Rfl:     cyclobenzaprine (FLEXERIL) 10 mg tablet, Take 10 mg by mouth Three times daily as needed, Disp: , Rfl:     ALLERGIES:  Allergies   Allergen Reactions    Cortisporin [Neomycin-Polymyxin-Hc]      Bacitra-Neomycin-Polymyxin-Hc Edema, Other (See Comments) and Rash     Ears got hot, itching and red    Medical Tape Rash     No paper tape           REVIEW OF SYSTEMS:  Musculoskeletal:        As noted in HPI.   All other systems reviewed and are negative.    VITALS:  There were no vitals filed for this visit.    LABS:  HgA1c:   Lab Results   Component Value Date    HGBA1C 8.8 (H) 12/23/2024     BMP:   Lab Results   Component Value Date    GLUCOSE 189 (H) 11/12/2015    CALCIUM 8.7 01/29/2025     11/12/2015    K 3.9 01/29/2025    CO2 26 01/29/2025     01/29/2025    BUN 10 01/29/2025    CREATININE 0.40 (L) 01/29/2025       _____________________________________________________  PHYSICAL EXAMINATION:  General: Well developed and well nourished, alert & oriented x 3, appears comfortable  Psychiatric: Normal  HEENT: Normocephalic, Atraumatic Trachea Midline, No torticollis  Pulmonary: No audible wheezing or respiratory distress   Abdomen/GI: Non tender, non distended   Cardiovascular: No pitting edema, 2+ radial pulse   Skin: No masses, erythema, lacerations, fluctation, ulcerations  Neurovascular: Sensation Intact to the Median, Ulnar, Radial Nerve, Motor Intact to the Median, Ulnar, Radial Nerve, and Pulses Intact  Musculoskeletal: Normal, except as noted in detailed exam and in HPI.      MUSCULOSKELETAL EXAMINATION:  Right hand    Examination of the affected extremity was compared to the unaffected extremity.  Skin was intact.  No swelling or ecchymosis.  No deformity.  Hand and fingers were warm and well-perfused.  Capillary refill was brisk.  Full active range of motion of the elbows, forearms, wrists, and fingers.  5/5 elbow flexors/extensors, wrist flexor/extensors, and .  Incision over first dorsal compartment with signs of discharge.  No signs of infection.    ___________________________________________________  STUDIES REVIEWED:  Wound cultures reviewed 1/28/25: MSSA  Note from orthopedics  1/30/25 reviewed  CT scan report reviewed of right hand          PROCEDURES PERFORMED:  Procedures  No Procedures performed today    _____________________________________________________      Scribe Attestation      I,:  Sam Marie am acting as a scribe while in the presence of the attending physician.:       I,:  Aaron Myers MD personally performed the services described in this documentation    as scribed in my presence.:

## 2025-02-26 ENCOUNTER — TELEPHONE (OUTPATIENT)
Dept: OBGYN CLINIC | Facility: MEDICAL CENTER | Age: 60
End: 2025-02-26

## 2025-02-26 ENCOUNTER — TELEPHONE (OUTPATIENT)
Age: 60
End: 2025-02-26

## 2025-02-26 ENCOUNTER — OFFICE VISIT (OUTPATIENT)
Dept: OBGYN CLINIC | Facility: CLINIC | Age: 60
End: 2025-02-26
Payer: COMMERCIAL

## 2025-02-26 DIAGNOSIS — L03.113 CELLULITIS OF RIGHT HAND: Primary | ICD-10-CM

## 2025-02-26 PROCEDURE — 99213 OFFICE O/P EST LOW 20 MIN: CPT | Performed by: SURGERY

## 2025-02-26 RX ORDER — CEPHALEXIN 500 MG/1
500 CAPSULE ORAL EVERY 6 HOURS SCHEDULED
COMMUNITY

## 2025-02-26 RX ORDER — CEPHALEXIN 500 MG/1
500 CAPSULE ORAL EVERY 6 HOURS SCHEDULED
Qty: 40 CAPSULE | Refills: 0 | Status: SHIPPED | OUTPATIENT
Start: 2025-02-26 | End: 2025-03-08

## 2025-02-26 NOTE — PROGRESS NOTES
Jeffery Acosta M.D.  Attending, Orthopaedic Surgery  Hand, Wrist, and Elbow Surgery  [facility]      ORTHOPAEDIC HAND, WRIST, AND ELBOW OFFICE  VISIT       ASSESSMENT/PLAN:        Assessment & Plan  Cellulitis of right hand  Right thumb cellulitis s/p bedside I&D on 1/28/25   We discussed the cellulitis may progress into an I&D if she fails oral antibiotics, currently no palpable fluid collection or abscess.   Keflex was prescribed and sent to her pharmacy electronically, to be taken for 10 days.  Advised to perform warm compresses.   May continue the use of Tylenol for pain control   She has a scheduled follow up with Dr. Myers on 2/28/25, advised to call the office if her symptoms worsen or fail to improve   Orders:    cephalexin (KEFLEX) 500 mg capsule; Take 1 capsule (500 mg total) by mouth every 6 (six) hours for 10 days      The patient verbalized understanding of exam findings and treatment plan. We engaged in the shared decision-making process and treatment options were discussed at length with the patient. Surgical and conservative management discussed today along with risks and benefits.    Diagnoses and all orders for this visit:    Cellulitis of right hand    Other orders  -     cephalexin (KEFLEX) 500 mg capsule; Take 500 mg by mouth every 6 (six) hours      Follow Up:  Return for as scheduled .    To Do Next Visit:  Re-evaluation of current issue    ____________________________________________________________________________________________________________________________________________      CHIEF COMPLAINT:  Chief Complaint   Patient presents with    Right Hand - Pain     Patient thumb is red swollen and warm to the touch .       SUBJECTIVE:  Loreto Taylor is a 59 y.o. year old female who presents to the office for evaluation of her right thumb. She was admitted to the hospital on 1/28/25 due to thumb cellulitis. At that time she underwent a bedside I&D by Ortho  \[facility]\"". She was prescribed oral ABX at that time, which she states she completed prior to seeing Dr. Myers in the office on 2/11/25. She notes her thumb was doing fine until yesterday when she began to have redness near her MP joint along with burning pain. She did verónica her skin prior to coming into the office today.        Pain/symptom timing:  Worse during the day when active  Pain/symptom context:  Worse with activites and work  Pain/symptom modifying factors:  Rest makes better, activities make worse  Pain/symptom associated signs/symptoms: none    Prior treatment   NSAIDsNo   Injections Yes thumb CMC and MP on 1/9/25 at outside facility    Bracing/Orthotics No    Physical Therapy No     I have personally reviewed all the relevant PMH, PSH, SH, FH, Medications and allergies      PAST MEDICAL HISTORY:  Past Medical History:   Diagnosis Date    Diabetes mellitus (HCC)     Gastroparesis     Giordano's syndrome        PAST SURGICAL HISTORY:  History reviewed. No pertinent surgical history.    FAMILY HISTORY:  History reviewed. No pertinent family history.    SOCIAL HISTORY:  Social History     Tobacco Use    Smoking status: Never   Vaping Use    Vaping status: Never Used   Substance Use Topics    Alcohol use: Never    Drug use: Never       MEDICATIONS:    Current Outpatient Medications:     cephalexin (KEFLEX) 500 mg capsule, Take 500 mg by mouth every 6 (six) hours, Disp: , Rfl:     Empagliflozin (JARDIANCE) 10 MG TABS tablet, Take 10 mg by mouth daily, Disp: , Rfl:     esomeprazole (NexIUM) 40 MG capsule, , Disp: , Rfl:     gabapentin (Neurontin) 300 mg capsule, Take by mouth, Disp: , Rfl:     glimepiride (AMARYL) 4 mg tablet, Take 2 tablets by mouth every morning, Disp: , Rfl:     ibuprofen (MOTRIN) 200 mg tablet, Take 200 mg by mouth every 6 (six) hours as needed, Disp: , Rfl:     loratadine (CLARITIN) 10 mg tablet, Take 10 mg by mouth, Disp: , Rfl:     LORazepam (Ativan) 1 mg tablet, Take by mouth, Disp:  , Rfl:     meloxicam (MOBIC) 7.5 mg tablet, Take 7.5 mg by mouth daily, Disp: , Rfl:     metFORMIN (GLUCOPHAGE) 500 mg tablet, Take 1 tablet by mouth 2 (two) times a day with meals, Disp: , Rfl:     pioglitazone (ACTOS) 45 mg tablet, Take 45 mg by mouth daily, Disp: , Rfl:     rOPINIRole (REQUIP) 1 mg tablet, Take 1 mg by mouth, Disp: , Rfl:     tobramycin-dexamethasone (TOBRADEX) ophthalmic suspension, , Disp: , Rfl:     cyclobenzaprine (FLEXERIL) 10 mg tablet, Take 10 mg by mouth Three times daily as needed, Disp: , Rfl:     ALLERGIES:  Allergies   Allergen Reactions    Cortisporin [Neomycin-Polymyxin-Hc]     Bacitra-Neomycin-Polymyxin-Hc Edema, Other (See Comments) and Rash     Ears got hot, itching and red    Medical Tape Rash     No paper tape           REVIEW OF SYSTEMS:  Review of Systems   Constitutional:  Negative for chills, fever and unexpected weight change.   HENT:  Negative for hearing loss, nosebleeds and sore throat.    Eyes:  Negative for pain, redness and visual disturbance.   Respiratory:  Negative for cough, shortness of breath and wheezing.    Cardiovascular:  Negative for chest pain, palpitations and leg swelling.   Gastrointestinal:  Negative for abdominal pain, nausea and vomiting.   Endocrine: Negative for polydipsia and polyuria.   Genitourinary:  Negative for difficulty urinating and hematuria.   Musculoskeletal:  Negative for arthralgias, joint swelling and myalgias.   Skin:  Negative for rash and wound.   Neurological:  Negative for dizziness, numbness and headaches.   Psychiatric/Behavioral:  Negative for decreased concentration, dysphoric mood and suicidal ideas. The patient is not nervous/anxious.        VITALS:  There were no vitals filed for this visit.    LABS:      _____________________________________________________  PHYSICAL EXAMINATION:  General: well developed and well nourished, alert, oriented times 3, and appears comfortable  Psychiatric: Normal  HEENT: Normocephalic,  Atraumatic Trachea Midline, No torticollis  Pulmonary: No audible wheezing or respiratory distress   Abdomen/GI: Non tender, non distended   Cardiovascular: No pitting edema, 2+ radial pulse   Skin: No Masses, No Lacerations, No Ulcerations  Neurovascular: Sensation Intact to the Median, Ulnar, Radial Nerve, Motor Intact to the Median, Ulnar, Radial Nerve, and Pulses Intact  Musculoskeletal: Normal, except as noted in detailed exam and in HPI.      MUSCULOSKELETAL EXAMINATION:    Right thumb:     1 x 1 cm area of erythema directly over the dorsum of the MP joint, tender to palpation immediately there but not significantly painful with range of motion of the MP joint. No tenderness palpation along the flexor tendon sheath, or the radial or ulnar aspects of the joint.  There is no fluctuance,  swelling in the area of erythema    ___________________________________________________  STUDIES REVIEWED:  I personally reviewed AP lateral oblique radiographs of right hand which demonstrates multifocal arthritic changes in multiple joints, severe with calcification of radial artery    LABS REVIEWED:    HgA1c:   Lab Results   Component Value Date    HGBA1C 8.8 (H) 12/23/2024     BMP:   Lab Results   Component Value Date    GLUCOSE 189 (H) 11/12/2015    CALCIUM 8.7 01/29/2025     11/12/2015    K 3.9 01/29/2025    CO2 26 01/29/2025     01/29/2025    BUN 10 01/29/2025    CREATININE 0.40 (L) 01/29/2025               PROCEDURES PERFORMED:  Procedures  No Procedures performed today    _____________________________________________________      Scribe Attestation      I,:  Vesta Kaur MA am acting as a scribe while in the presence of the attending physician.:       I,:  Jeffery Acosta MD personally performed the services described in this documentation    as scribed in my presence.:

## 2025-02-26 NOTE — TELEPHONE ENCOUNTER
Caller: Patient- Loreto     Doctor: Dr. Myers    Reason for call: Patient is calling in stating that she is experiencing extreme swelling, redness and pain above the incision area and feels as though it is back. The patient is not schedule to come into the office to be seen until 3/11 and feels as though she is not able to wait to be seen until then. She is asking as to what further she can do relating this, please advise and reach out to patient.    Call back#: [phone]

## 2025-02-26 NOTE — TELEPHONE ENCOUNTER
Called patient back but no answer. Left voicemail to call back. Would like to discuss current symptoms. Can consider refill of oral abx for now and FU with  On Friday and he is not in the office today or tomorrow. Would like to discuss symptoms further with patient though. If this seems urgent we can try to get her in with one of our other hand surgeons.

## 2025-02-26 NOTE — ASSESSMENT & PLAN NOTE
Right thumb cellulitis s/p bedside I&D on 1/28/25   We discussed the cellulitis may progress into an I&D if she fails oral antibiotics, currently no palpable fluid collection or abscess.   Keflex was prescribed and sent to her pharmacy electronically, to be taken for 10 days.  Advised to perform warm compresses.   May continue the use of Tylenol for pain control   She has a scheduled follow up with Dr. Myers on 2/28/25, advised to call the office if her symptoms worsen or fail to improve   Orders:    cephalexin (KEFLEX) 500 mg capsule; Take 1 capsule (500 mg total) by mouth every 6 (six) hours for 10 days

## 2025-02-26 NOTE — TELEPHONE ENCOUNTER
Received transfer call from pt. She states she has redness at the joint when the incision was made and the pain is increasing along with swelling. Gave her my email address since she does not have [place]. Once she sends the pictures I will post them.

## 2025-02-28 ENCOUNTER — OFFICE VISIT (OUTPATIENT)
Dept: OBGYN CLINIC | Facility: CLINIC | Age: 60
End: 2025-02-28
Payer: COMMERCIAL

## 2025-02-28 VITALS — HEIGHT: 58 IN | BODY MASS INDEX: 31.49 KG/M2 | WEIGHT: 150 LBS

## 2025-02-28 DIAGNOSIS — L03.113 CELLULITIS OF RIGHT HAND: Primary | ICD-10-CM

## 2025-02-28 PROCEDURE — 99213 OFFICE O/P EST LOW 20 MIN: CPT | Performed by: STUDENT IN AN ORGANIZED HEALTH CARE EDUCATION/TRAINING PROGRAM

## 2025-02-28 NOTE — ASSESSMENT & PLAN NOTE
S/p bedisde I&D on 1/28/25  On exam, there is no palpation fluid collections.  Updated images were taken and uploaded into the patient's chart.  She will continue with the antibiotics as prescribed.  She will continue with the soaks.   We will continue to monitor closely.

## 2025-02-28 NOTE — PROGRESS NOTES
ORTHOPAEDIC HAND, WRIST, AND ELBOW OFFICE  VISIT      ASSESSMENT/PLAN:      Diagnoses and all orders for this visit:    Pain of hand, unspecified laterality        Assessment & Plan  Pain of hand, unspecified laterality           Anatomy of the condition/s as well as treatment options and expected outcomes were discussed.  Any pertinent imaging or lab results were reviewed with the patient.   The patient verbalized understanding of exam findings and treatment plan.   The patient was given the opportunity to ask questions.  Questions were answered to the patient's satisfaction.  The patient decided to move forward with ***.      Follow Up:  {gsfollowup:59464}       To Do Next Visit:  {To do next visit:42785}      Discussions:  {gsdiscussions:02494}      Aaron Myers MD  Attending, Orthopaedic Surgery  Hand, Wrist, and Elbow Surgery  [facility]    ______________________________________________________________________________________________    CHIEF COMPLAINT:  No chief complaint on file.      SUBJECTIVE:  Patient is a 59 y.o. ***HD female who presents today for evaluation and treatment of ***      Occupation: ***     I have personally reviewed all the relevant PMH, PSH, SH, FH, Medications and allergies      PAST MEDICAL HISTORY:  Past Medical History:   Diagnosis Date    Diabetes mellitus (HCC)     Gastroparesis     Giordano's syndrome        PAST SURGICAL HISTORY:  No past surgical history on file.    FAMILY HISTORY:  No family history on file.    SOCIAL HISTORY:  Social History     Tobacco Use    Smoking status: Never   Vaping Use    Vaping status: Never Used   Substance Use Topics    Alcohol use: Never    Drug use: Never       MEDICATIONS:    Current Outpatient Medications:     cephalexin (KEFLEX) 500 mg capsule, Take 500 mg by mouth every 6 (six) hours, Disp: , Rfl:     cephalexin (KEFLEX) 500 mg capsule, Take 1 capsule (500 mg total) by mouth every 6 (six) hours for 10 days, Disp: 40  capsule, Rfl: 0    cyclobenzaprine (FLEXERIL) 10 mg tablet, Take 10 mg by mouth Three times daily as needed, Disp: , Rfl:     Empagliflozin (JARDIANCE) 10 MG TABS tablet, Take 10 mg by mouth daily, Disp: , Rfl:     esomeprazole (NexIUM) 40 MG capsule, , Disp: , Rfl:     gabapentin (Neurontin) 300 mg capsule, Take by mouth, Disp: , Rfl:     glimepiride (AMARYL) 4 mg tablet, Take 2 tablets by mouth every morning, Disp: , Rfl:     ibuprofen (MOTRIN) 200 mg tablet, Take 200 mg by mouth every 6 (six) hours as needed, Disp: , Rfl:     loratadine (CLARITIN) 10 mg tablet, Take 10 mg by mouth, Disp: , Rfl:     LORazepam (Ativan) 1 mg tablet, Take by mouth, Disp: , Rfl:     meloxicam (MOBIC) 7.5 mg tablet, Take 7.5 mg by mouth daily, Disp: , Rfl:     metFORMIN (GLUCOPHAGE) 500 mg tablet, Take 1 tablet by mouth 2 (two) times a day with meals, Disp: , Rfl:     pioglitazone (ACTOS) 45 mg tablet, Take 45 mg by mouth daily, Disp: , Rfl:     rOPINIRole (REQUIP) 1 mg tablet, Take 1 mg by mouth, Disp: , Rfl:     tobramycin-dexamethasone (TOBRADEX) ophthalmic suspension, , Disp: , Rfl:     ALLERGIES:  Allergies   Allergen Reactions    Cortisporin [Neomycin-Polymyxin-Hc]     Bacitra-Neomycin-Polymyxin-Hc Edema, Other (See Comments) and Rash     Ears got hot, itching and red    Medical Tape Rash     No paper tape           REVIEW OF SYSTEMS:  Musculoskeletal:        As noted in HPI.   All other systems reviewed and are negative.    VITALS:  There were no vitals filed for this visit.    LABS:  HgA1c:   Lab Results   Component Value Date    HGBA1C 8.8 (H) 12/23/2024     BMP:   Lab Results   Component Value Date    GLUCOSE 189 (H) 11/12/2015    CALCIUM 8.7 01/29/2025     11/12/2015    K 3.9 01/29/2025    CO2 26 01/29/2025     01/29/2025    BUN 10 01/29/2025    CREATININE 0.40 (L) 01/29/2025       _____________________________________________________  PHYSICAL EXAMINATION:  General: Well developed and well nourished, alert &  "oriented x 3, appears comfortable  Psychiatric: Normal  HEENT: Normocephalic, Atraumatic Trachea Midline, No torticollis  Pulmonary: No audible wheezing or respiratory distress   Abdomen/GI: Non tender, non distended   Cardiovascular: No pitting edema, 2+ radial pulse   Skin: {Skin exam:31981}  Neurovascular: {Nerve Exam:65423::\"Sensation Intact to the Median, Ulnar, Radial Nerve\",\"Motor Intact to the Median, Ulnar, Radial Nerve\",\"Pulses Intact\"}  Musculoskeletal: Normal, except as noted in detailed exam and in HPI.      MUSCULOSKELETAL EXAMINATION:  ***    ___________________________________________________  STUDIES REVIEWED:  {gsstudiesreviewed:43909}         PROCEDURES PERFORMED:  Procedures  {Was Procdoc done:04381::\"No Procedures performed today\"}    _____________________________________________________      Scribe Attestation      I,:   am acting as a scribe while in the presence of the attending physician.:       I,:   personally performed the services described in this documentation    as scribed in my presence.:             "

## 2025-02-28 NOTE — PROGRESS NOTES
ORTHOPAEDIC HAND, WRIST, AND ELBOW OFFICE  VISIT      ASSESSMENT/PLAN:      Assessment & Plan  Cellulitis of right hand  S/p bedisde I&D on 1/28/25  On exam, there is no palpation fluid collections.  Updated images were taken and uploaded into the patient's chart.  She will continue with the antibiotics as prescribed.  She will continue with the soaks.   We will continue to monitor closely.            Follow Up:  1 week       To Do Next Visit:  X-rays of the  right  hand , no special views needed        Aaron Myers MD  Attending, Orthopaedic Surgery  Hand, Wrist, and Elbow Surgery  [facility]    ______________________________________________________________________________________________    CHIEF COMPLAINT:  Chief Complaint   Patient presents with    Left Thumb - Follow-up       SUBJECTIVE:  Patient is a 59 y.o.  female who presents today for follow up of Right thumb cellulitis s/p bedside I&D on 1/28/25. The patient notes approx 50% improvement since her last visit 2 days ago. The patient has a history of infections previously without a cause. She has been compliant with the antibiotics.      Occupation: retired nurse     I have personally reviewed all the relevant PMH, PSH, SH, FH, Medications and allergies      PAST MEDICAL HISTORY:  Past Medical History:   Diagnosis Date    Diabetes mellitus (HCC)     Gastroparesis     Giordano's syndrome        PAST SURGICAL HISTORY:  History reviewed. No pertinent surgical history.    FAMILY HISTORY:  History reviewed. No pertinent family history.    SOCIAL HISTORY:  Social History     Tobacco Use    Smoking status: Never   Vaping Use    Vaping status: Never Used   Substance Use Topics    Alcohol use: Never    Drug use: Never       MEDICATIONS:    Current Outpatient Medications:     cephalexin (KEFLEX) 500 mg capsule, Take 1 capsule (500 mg total) by mouth every 6 (six) hours for 10 days, Disp: 40 capsule, Rfl: 0    Empagliflozin (JARDIANCE) 10 MG  TABS tablet, Take 10 mg by mouth daily, Disp: , Rfl:     esomeprazole (NexIUM) 40 MG capsule, , Disp: , Rfl:     gabapentin (Neurontin) 300 mg capsule, Take by mouth, Disp: , Rfl:     glimepiride (AMARYL) 4 mg tablet, Take 2 tablets by mouth every morning, Disp: , Rfl:     ibuprofen (MOTRIN) 200 mg tablet, Take 200 mg by mouth every 6 (six) hours as needed, Disp: , Rfl:     loratadine (CLARITIN) 10 mg tablet, Take 10 mg by mouth, Disp: , Rfl:     meloxicam (MOBIC) 7.5 mg tablet, Take 7.5 mg by mouth daily, Disp: , Rfl:     metFORMIN (GLUCOPHAGE) 500 mg tablet, Take 1 tablet by mouth 2 (two) times a day with meals, Disp: , Rfl:     pioglitazone (ACTOS) 45 mg tablet, Take 45 mg by mouth daily, Disp: , Rfl:     rOPINIRole (REQUIP) 1 mg tablet, Take 1 mg by mouth, Disp: , Rfl:     tobramycin-dexamethasone (TOBRADEX) ophthalmic suspension, , Disp: , Rfl:     cephalexin (KEFLEX) 500 mg capsule, Take 500 mg by mouth every 6 (six) hours (Patient not taking: Reported on 2/28/2025), Disp: , Rfl:     cyclobenzaprine (FLEXERIL) 10 mg tablet, Take 10 mg by mouth Three times daily as needed, Disp: , Rfl:     LORazepam (Ativan) 1 mg tablet, Take by mouth (Patient not taking: Reported on 2/28/2025), Disp: , Rfl:     ALLERGIES:  Allergies   Allergen Reactions    Cortisporin [Neomycin-Polymyxin-Hc]     Bacitra-Neomycin-Polymyxin-Hc Edema, Other (See Comments) and Rash     Ears got hot, itching and red    Medical Tape Rash     No paper tape           REVIEW OF SYSTEMS:  Musculoskeletal:        As noted in HPI.   All other systems reviewed and are negative.    VITALS:  There were no vitals filed for this visit.    LABS:  HgA1c:   Lab Results   Component Value Date    HGBA1C 8.8 (H) 12/23/2024     BMP:   Lab Results   Component Value Date    GLUCOSE 189 (H) 11/12/2015    CALCIUM 8.7 01/29/2025     11/12/2015    K 3.9 01/29/2025    CO2 26 01/29/2025     01/29/2025    BUN 10 01/29/2025    CREATININE 0.40 (L) 01/29/2025        _____________________________________________________  PHYSICAL EXAMINATION:  General: Well developed and well nourished, alert & oriented x 3, appears comfortable  Psychiatric: Normal  HEENT: Normocephalic, Atraumatic Trachea Midline, No torticollis  Pulmonary: No audible wheezing or respiratory distress   Abdomen/GI: Non tender, non distended   Cardiovascular: No pitting edema, 2+ radial pulse   Skin: No Lacerations, No Fluctuation, No Ulcerations  Neurovascular: Sensation Intact to the Median, Ulnar, Radial Nerve, Motor Intact to the Median, Ulnar, Radial Nerve, and Pulses Intact  Musculoskeletal: Normal, except as noted in detailed exam and in HPI.      MUSCULOSKELETAL EXAMINATION:  Right thumb  No pain with MCP motion  No fluctuance  Erythema at the level of the metacarpal head at the level of the thumb  No palpable fluid collections  No pain with thumb MCP motion         ___________________________________________________  STUDIES REVIEWED:  Note from Dr. Acosta reviewed  Prior right hand x-rays reviewed        PROCEDURES PERFORMED:  Procedures  No Procedures performed today    _____________________________________________________      Scribe Attestation      I,:  Vianca Kaur MA am acting as a scribe while in the presence of the attending physician.:       I,:  Aaron Myesr MD personally performed the services described in this documentation    as scribed in my presence.:

## 2025-03-09 ENCOUNTER — TELEPHONE (OUTPATIENT)
Dept: OTHER | Facility: OTHER | Age: 60
End: 2025-03-09

## 2025-03-09 NOTE — TELEPHONE ENCOUNTER
Patient is calling regarding cancelling an appointment.    Date/Time: 3/11/25 915    Patient was rescheduled: YES [] NO [x]    Patient requesting call back to reschedule: YES [] NO [x]